# Patient Record
Sex: MALE | Race: BLACK OR AFRICAN AMERICAN | NOT HISPANIC OR LATINO | Employment: OTHER | URBAN - METROPOLITAN AREA
[De-identification: names, ages, dates, MRNs, and addresses within clinical notes are randomized per-mention and may not be internally consistent; named-entity substitution may affect disease eponyms.]

---

## 2019-07-18 ENCOUNTER — APPOINTMENT (EMERGENCY)
Dept: RADIOLOGY | Facility: HOSPITAL | Age: 59
End: 2019-07-18

## 2019-07-18 ENCOUNTER — HOSPITAL ENCOUNTER (EMERGENCY)
Facility: HOSPITAL | Age: 59
Discharge: HOME | End: 2019-07-19
Attending: EMERGENCY MEDICINE

## 2019-07-18 DIAGNOSIS — S01.112A LEFT EYELID LACERATION, INITIAL ENCOUNTER: ICD-10-CM

## 2019-07-18 DIAGNOSIS — F10.929 ALCOHOL INTOXICATION (HCC): ICD-10-CM
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
## 2019-07-18 DIAGNOSIS — S61.219A FINGER LACERATION: ICD-10-CM

## 2019-07-18 DIAGNOSIS — S70.311A ABRASION OF RIGHT THIGH, INITIAL ENCOUNTER: ICD-10-CM

## 2019-07-18 DIAGNOSIS — S02.2XXA NASAL BONE FRACTURE: ICD-10-CM

## 2019-07-18 DIAGNOSIS — S00.31XA ABRASION OF NOSE, INITIAL ENCOUNTER: ICD-10-CM

## 2019-07-18 LAB
ABO GROUP BLD: NORMAL
ALBUMIN SERPL BCP-MCNC: 4 G/DL (ref 3.5–5)
ALP SERPL-CCNC: 106 U/L (ref 46–116)
ALT SERPL W P-5'-P-CCNC: 62 U/L (ref 12–78)
AMPHETAMINES SERPL QL SCN: NEGATIVE
ANION GAP SERPL CALCULATED.3IONS-SCNC: 13 MMOL/L (ref 4–13)
APTT PPP: 24 SECONDS (ref 23–37)
AST SERPL W P-5'-P-CCNC: 42 U/L (ref 5–45)
BACTERIA UR QL AUTO: ABNORMAL /HPF
BARBITURATES UR QL: NEGATIVE
BASOPHILS # BLD AUTO: 0.03 THOUSANDS/ΜL (ref 0–0.1)
BASOPHILS NFR BLD AUTO: 0 % (ref 0–1)
BENZODIAZ UR QL: NEGATIVE
BILIRUB SERPL-MCNC: 0.3 MG/DL (ref 0.2–1)
BILIRUB UR QL STRIP: NEGATIVE
BLD GP AB SCN SERPL QL: NEGATIVE
BUN SERPL-MCNC: 12 MG/DL (ref 5–25)
CALCIUM SERPL-MCNC: 8.9 MG/DL (ref 8.3–10.1)
CHLORIDE SERPL-SCNC: 104 MMOL/L (ref 100–108)
CLARITY UR: CLEAR
CO2 SERPL-SCNC: 23 MMOL/L (ref 21–32)
COCAINE UR QL: NEGATIVE
COLOR UR: ABNORMAL
CREAT SERPL-MCNC: 1.07 MG/DL (ref 0.6–1.3)
EOSINOPHIL # BLD AUTO: 0.07 THOUSAND/ΜL (ref 0–0.61)
EOSINOPHIL NFR BLD AUTO: 1 % (ref 0–6)
ERYTHROCYTE [DISTWIDTH] IN BLOOD BY AUTOMATED COUNT: 14.7 % (ref 11.6–15.1)
ETHANOL SERPL-MCNC: 280 MG/DL (ref 0–3)
GFR SERPL CREATININE-BSD FRML MDRD: 75 ML/MIN/1.73SQ M
GLUCOSE SERPL-MCNC: 114 MG/DL (ref 65–140)
GLUCOSE UR STRIP-MCNC: NEGATIVE MG/DL
HCT VFR BLD AUTO: 40.6 % (ref 36.5–49.3)
HGB BLD-MCNC: 14.5 G/DL (ref 12–17)
HGB UR QL STRIP.AUTO: ABNORMAL
IMM GRANULOCYTES # BLD AUTO: 0.04 THOUSAND/UL (ref 0–0.2)
IMM GRANULOCYTES NFR BLD AUTO: 0 % (ref 0–2)
INR PPP: 0.95 (ref 0.86–1.16)
KETONES UR STRIP-MCNC: NEGATIVE MG/DL
LEUKOCYTE ESTERASE UR QL STRIP: NEGATIVE
LYMPHOCYTES # BLD AUTO: 2.81 THOUSANDS/ΜL (ref 0.6–4.47)
LYMPHOCYTES NFR BLD AUTO: 29 % (ref 14–44)
MAGNESIUM SERPL-MCNC: 1.8 MG/DL (ref 1.6–2.6)
MCH RBC QN AUTO: 32.7 PG (ref 26.8–34.3)
MCHC RBC AUTO-ENTMCNC: 35.7 G/DL (ref 31.4–37.4)
MCV RBC AUTO: 92 FL (ref 82–98)
METHADONE UR QL: NEGATIVE
MONOCYTES # BLD AUTO: 0.82 THOUSAND/ΜL (ref 0.17–1.22)
MONOCYTES NFR BLD AUTO: 8 % (ref 4–12)
NEUTROPHILS # BLD AUTO: 5.95 THOUSANDS/ΜL (ref 1.85–7.62)
NEUTS SEG NFR BLD AUTO: 62 % (ref 43–75)
NITRITE UR QL STRIP: NEGATIVE
NON-SQ EPI CELLS URNS QL MICRO: ABNORMAL /HPF
NRBC BLD AUTO-RTO: 0 /100 WBCS
OPIATES UR QL SCN: NEGATIVE
PCP UR QL: NEGATIVE
PH UR STRIP.AUTO: 5.5 [PH]
PHOSPHATE SERPL-MCNC: 3.6 MG/DL (ref 2.3–4.1)
PLATELET # BLD AUTO: 230 THOUSANDS/UL (ref 149–390)
PMV BLD AUTO: 9.5 FL (ref 8.9–12.7)
POTASSIUM SERPL-SCNC: 4.3 MMOL/L (ref 3.5–5.3)
PROT SERPL-MCNC: 7.8 G/DL (ref 6.4–8.2)
PROT UR STRIP-MCNC: NEGATIVE MG/DL
PROTHROMBIN TIME: 10 SECONDS (ref 9.4–11.7)
RBC # BLD AUTO: 4.43 MILLION/UL (ref 3.88–5.62)
RBC #/AREA URNS AUTO: ABNORMAL /HPF
RH BLD: POSITIVE
SODIUM SERPL-SCNC: 140 MMOL/L (ref 136–145)
SP GR UR STRIP.AUTO: <=1.005 (ref 1–1.03)
SPECIMEN EXPIRATION DATE: NORMAL
THC UR QL: POSITIVE
TROPONIN I SERPL-MCNC: <0.02 NG/ML
UROBILINOGEN UR QL STRIP.AUTO: 0.2 E.U./DL
WBC # BLD AUTO: 9.72 THOUSAND/UL (ref 4.31–10.16)
WBC #/AREA URNS AUTO: ABNORMAL /HPF

## 2019-07-18 PROCEDURE — 93005 ELECTROCARDIOGRAM TRACING: CPT

## 2019-07-18 PROCEDURE — 80053 COMPREHEN METABOLIC PANEL: CPT | Performed by: EMERGENCY MEDICINE

## 2019-07-18 PROCEDURE — 85610 PROTHROMBIN TIME: CPT | Performed by: EMERGENCY MEDICINE

## 2019-07-18 PROCEDURE — 73552 X-RAY EXAM OF FEMUR 2/>: CPT

## 2019-07-18 PROCEDURE — 73130 X-RAY EXAM OF HAND: CPT

## 2019-07-18 PROCEDURE — 73560 X-RAY EXAM OF KNEE 1 OR 2: CPT

## 2019-07-18 PROCEDURE — 96374 THER/PROPH/DIAG INJ IV PUSH: CPT

## 2019-07-18 PROCEDURE — 96361 HYDRATE IV INFUSION ADD-ON: CPT

## 2019-07-18 PROCEDURE — 85025 COMPLETE CBC W/AUTO DIFF WBC: CPT | Performed by: EMERGENCY MEDICINE

## 2019-07-18 PROCEDURE — 70486 CT MAXILLOFACIAL W/O DYE: CPT

## 2019-07-18 PROCEDURE — 85730 THROMBOPLASTIN TIME PARTIAL: CPT | Performed by: EMERGENCY MEDICINE

## 2019-07-18 PROCEDURE — 84484 ASSAY OF TROPONIN QUANT: CPT | Performed by: EMERGENCY MEDICINE

## 2019-07-18 PROCEDURE — 72190 X-RAY EXAM OF PELVIS: CPT

## 2019-07-18 PROCEDURE — 86900 BLOOD TYPING SEROLOGIC ABO: CPT | Performed by: EMERGENCY MEDICINE

## 2019-07-18 PROCEDURE — 80307 DRUG TEST PRSMV CHEM ANLYZR: CPT | Performed by: EMERGENCY MEDICINE

## 2019-07-18 PROCEDURE — 86901 BLOOD TYPING SEROLOGIC RH(D): CPT | Performed by: EMERGENCY MEDICINE

## 2019-07-18 PROCEDURE — 96375 TX/PRO/DX INJ NEW DRUG ADDON: CPT

## 2019-07-18 PROCEDURE — 81001 URINALYSIS AUTO W/SCOPE: CPT | Performed by: EMERGENCY MEDICINE

## 2019-07-18 PROCEDURE — 99285 EMERGENCY DEPT VISIT HI MDM: CPT

## 2019-07-18 PROCEDURE — 71260 CT THORAX DX C+: CPT

## 2019-07-18 PROCEDURE — 84100 ASSAY OF PHOSPHORUS: CPT | Performed by: EMERGENCY MEDICINE

## 2019-07-18 PROCEDURE — 83735 ASSAY OF MAGNESIUM: CPT | Performed by: EMERGENCY MEDICINE

## 2019-07-18 PROCEDURE — 70450 CT HEAD/BRAIN W/O DYE: CPT

## 2019-07-18 PROCEDURE — 36415 COLL VENOUS BLD VENIPUNCTURE: CPT | Performed by: EMERGENCY MEDICINE

## 2019-07-18 PROCEDURE — 74177 CT ABD & PELVIS W/CONTRAST: CPT

## 2019-07-18 PROCEDURE — 72125 CT NECK SPINE W/O DYE: CPT

## 2019-07-18 PROCEDURE — 73080 X-RAY EXAM OF ELBOW: CPT

## 2019-07-18 PROCEDURE — 73090 X-RAY EXAM OF FOREARM: CPT

## 2019-07-18 PROCEDURE — 80320 DRUG SCREEN QUANTALCOHOLS: CPT | Performed by: EMERGENCY MEDICINE

## 2019-07-18 PROCEDURE — 86850 RBC ANTIBODY SCREEN: CPT | Performed by: EMERGENCY MEDICINE

## 2019-07-18 RX ORDER — ATORVASTATIN CALCIUM 20 MG/1
20 TABLET, FILM COATED ORAL
COMMUNITY

## 2019-07-18 RX ORDER — GABAPENTIN 300 MG/1
300 CAPSULE ORAL 3 TIMES DAILY
COMMUNITY

## 2019-07-18 RX ORDER — ONDANSETRON 2 MG/ML
4 INJECTION INTRAMUSCULAR; INTRAVENOUS ONCE
Status: COMPLETED | OUTPATIENT
Start: 2019-07-18 | End: 2019-07-18

## 2019-07-18 RX ORDER — LIDOCAINE HYDROCHLORIDE 20 MG/ML
20 INJECTION, SOLUTION EPIDURAL; INFILTRATION; INTRACAUDAL; PERINEURAL ONCE
Status: COMPLETED | OUTPATIENT
Start: 2019-07-18 | End: 2019-07-18

## 2019-07-18 RX ORDER — HYDROMORPHONE HCL/PF 1 MG/ML
1 SYRINGE (ML) INJECTION ONCE
Status: COMPLETED | OUTPATIENT
Start: 2019-07-18 | End: 2019-07-18

## 2019-07-18 RX ORDER — FENTANYL CITRATE 50 UG/ML
100 INJECTION, SOLUTION INTRAMUSCULAR; INTRAVENOUS ONCE
Status: COMPLETED | OUTPATIENT
Start: 2019-07-18 | End: 2019-07-18

## 2019-07-18 RX ORDER — DIAZEPAM 5 MG/ML
10 INJECTION, SOLUTION INTRAMUSCULAR; INTRAVENOUS ONCE
Status: DISCONTINUED | OUTPATIENT
Start: 2019-07-18 | End: 2019-07-19 | Stop reason: HOSPADM

## 2019-07-18 RX ORDER — GINSENG 100 MG
1 CAPSULE ORAL ONCE
Status: COMPLETED | OUTPATIENT
Start: 2019-07-18 | End: 2019-07-18

## 2019-07-18 RX ADMIN — ONDANSETRON 4 MG: 2 INJECTION INTRAMUSCULAR; INTRAVENOUS at 21:48

## 2019-07-18 RX ADMIN — BACITRACIN ZINC 1 LARGE APPLICATION: 500 OINTMENT TOPICAL at 23:37

## 2019-07-18 RX ADMIN — FENTANYL CITRATE 100 MCG: 50 INJECTION, SOLUTION INTRAMUSCULAR; INTRAVENOUS at 20:59

## 2019-07-18 RX ADMIN — LIDOCAINE HYDROCHLORIDE 20 ML: 20 INJECTION, SOLUTION EPIDURAL; INFILTRATION; INTRACAUDAL; PERINEURAL at 22:52

## 2019-07-18 RX ADMIN — IOHEXOL 100 ML: 350 INJECTION, SOLUTION INTRAVENOUS at 21:34

## 2019-07-18 RX ADMIN — HYDROMORPHONE HYDROCHLORIDE 1 MG: 1 INJECTION, SOLUTION INTRAMUSCULAR; INTRAVENOUS; SUBCUTANEOUS at 22:51

## 2019-07-18 RX ADMIN — SODIUM CHLORIDE 1000 ML: 0.9 INJECTION, SOLUTION INTRAVENOUS at 20:59

## 2019-07-19 ENCOUNTER — APPOINTMENT (EMERGENCY)
Dept: RADIOLOGY | Facility: HOSPITAL | Age: 59
End: 2019-07-19

## 2019-07-19 VITALS
RESPIRATION RATE: 15 BRPM | OXYGEN SATURATION: 97 % | TEMPERATURE: 97.7 F | HEIGHT: 72 IN | DIASTOLIC BLOOD PRESSURE: 91 MMHG | SYSTOLIC BLOOD PRESSURE: 142 MMHG | HEART RATE: 84 BPM

## 2019-07-19 LAB
ATRIAL RATE: 106 BPM
P AXIS: 71 DEGREES
PR INTERVAL: 138 MS
QRS AXIS: 27 DEGREES
QRSD INTERVAL: 86 MS
QT INTERVAL: 334 MS
QTC INTERVAL: 443 MS
T WAVE AXIS: 92 DEGREES
VENTRICULAR RATE: 106 BPM

## 2019-07-19 PROCEDURE — 96375 TX/PRO/DX INJ NEW DRUG ADDON: CPT

## 2019-07-19 PROCEDURE — 93010 ELECTROCARDIOGRAM REPORT: CPT | Performed by: INTERNAL MEDICINE

## 2019-07-19 PROCEDURE — 73700 CT LOWER EXTREMITY W/O DYE: CPT

## 2019-07-19 RX ORDER — AMOXICILLIN AND CLAVULANATE POTASSIUM 875; 125 MG/1; MG/1
1 TABLET, FILM COATED ORAL ONCE
Status: COMPLETED | OUTPATIENT
Start: 2019-07-19 | End: 2019-07-19

## 2019-07-19 RX ORDER — AMOXICILLIN AND CLAVULANATE POTASSIUM 875; 125 MG/1; MG/1
1 TABLET, FILM COATED ORAL EVERY 12 HOURS
Qty: 14 TABLET | Refills: 0 | Status: SHIPPED | OUTPATIENT
Start: 2019-07-19 | End: 2019-07-26

## 2019-07-19 RX ORDER — HYDROMORPHONE HCL/PF 1 MG/ML
1 SYRINGE (ML) INJECTION ONCE
Status: COMPLETED | OUTPATIENT
Start: 2019-07-19 | End: 2019-07-19

## 2019-07-19 RX ORDER — NAPROXEN 500 MG/1
500 TABLET ORAL 2 TIMES DAILY WITH MEALS
Qty: 20 TABLET | Refills: 0 | Status: SHIPPED | OUTPATIENT
Start: 2019-07-19

## 2019-07-19 RX ORDER — TRAMADOL HYDROCHLORIDE 50 MG/1
50 TABLET ORAL EVERY 8 HOURS PRN
Qty: 15 TABLET | Refills: 0 | Status: SHIPPED | OUTPATIENT
Start: 2019-07-19

## 2019-07-19 RX ORDER — BACITRACIN, NEOMYCIN, POLYMYXIN B 400; 3.5; 5 [USP'U]/G; MG/G; [USP'U]/G
OINTMENT TOPICAL 2 TIMES DAILY
Qty: 15 G | Refills: 0 | Status: SHIPPED | OUTPATIENT
Start: 2019-07-19

## 2019-07-19 RX ADMIN — AMOXICILLIN AND CLAVULANATE POTASSIUM 1 TABLET: 875; 125 TABLET, FILM COATED ORAL at 04:51

## 2019-07-19 RX ADMIN — HYDROMORPHONE HYDROCHLORIDE 1 MG: 1 INJECTION, SOLUTION INTRAMUSCULAR; INTRAVENOUS; SUBCUTANEOUS at 00:52

## 2019-07-19 NOTE — ED NOTES
Daughter at bedside  Pt and daughter both verbalized understanding discharge instructions    Pt taken out in a wheelchair, denies any complaints     Char Andrew RN  07/19/19 7467
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
## 2019-07-19 NOTE — ED NOTES
Daughter called,aware patient is going to be discharged   Will come pick him up     Leonel Persaud RN  07/19/19 4314

## 2019-08-07 ENCOUNTER — HOSPITAL ENCOUNTER (INPATIENT)
Facility: HOSPITAL | Age: 59
LOS: 3 days | Discharge: HOME | DRG: 282 | End: 2019-08-10
Attending: EMERGENCY MEDICINE | Admitting: INTERNAL MEDICINE
Payer: COMMERCIAL

## 2019-08-07 ENCOUNTER — APPOINTMENT (EMERGENCY)
Dept: RADIOLOGY | Facility: HOSPITAL | Age: 59
DRG: 282 | End: 2019-08-07
Payer: COMMERCIAL

## 2019-08-07 DIAGNOSIS — K85.90 PANCREATITIS: ICD-10-CM

## 2019-08-07 DIAGNOSIS — I10 BENIGN ESSENTIAL HTN: ICD-10-CM

## 2019-08-07 DIAGNOSIS — R10.9 ABDOMINAL PAIN: Primary | ICD-10-CM

## 2019-08-07 DIAGNOSIS — F17.200 SMOKER: ICD-10-CM

## 2019-08-07 PROBLEM — S39.91XA ABDOMINAL TRAUMA: Status: ACTIVE | Noted: 2019-08-07

## 2019-08-07 PROBLEM — F12.90 MARIJUANA USE: Status: ACTIVE | Noted: 2019-08-07

## 2019-08-07 PROBLEM — F10.10 ALCOHOL ABUSE: Status: ACTIVE | Noted: 2019-08-07

## 2019-08-07 PROBLEM — E78.5 DYSLIPIDEMIA: Status: ACTIVE | Noted: 2019-08-07

## 2019-08-07 PROBLEM — R65.10 SIRS (SYSTEMIC INFLAMMATORY RESPONSE SYNDROME) (HCC): Status: ACTIVE | Noted: 2019-08-07

## 2019-08-07 PROBLEM — K85.20 ALCOHOL-INDUCED ACUTE PANCREATITIS WITHOUT INFECTION OR NECROSIS: Status: ACTIVE | Noted: 2019-08-07

## 2019-08-07 PROBLEM — R07.89 ATYPICAL CHEST PAIN: Status: ACTIVE | Noted: 2019-08-07

## 2019-08-07 LAB
ALBUMIN SERPL BCP-MCNC: 4.1 G/DL (ref 3.5–5)
ALP SERPL-CCNC: 109 U/L (ref 46–116)
ALT SERPL W P-5'-P-CCNC: 46 U/L (ref 12–78)
AMPHETAMINES SERPL QL SCN: NEGATIVE
ANION GAP SERPL CALCULATED.3IONS-SCNC: 16 MMOL/L (ref 4–13)
AST SERPL W P-5'-P-CCNC: 29 U/L (ref 5–45)
ATRIAL RATE: 105 BPM
ATRIAL RATE: 267 BPM
BARBITURATES UR QL: NEGATIVE
BASOPHILS # BLD AUTO: 0.01 THOUSANDS/ΜL (ref 0–0.1)
BASOPHILS NFR BLD AUTO: 0 % (ref 0–1)
BENZODIAZ UR QL: NEGATIVE
BILIRUB SERPL-MCNC: 0.3 MG/DL (ref 0.2–1)
BUN SERPL-MCNC: 9 MG/DL (ref 5–25)
CALCIUM SERPL-MCNC: 9.1 MG/DL (ref 8.3–10.1)
CHLORIDE SERPL-SCNC: 104 MMOL/L (ref 100–108)
CO2 SERPL-SCNC: 22 MMOL/L (ref 21–32)
COCAINE UR QL: NEGATIVE
CREAT SERPL-MCNC: 0.96 MG/DL (ref 0.6–1.3)
EOSINOPHIL # BLD AUTO: 0.05 THOUSAND/ΜL (ref 0–0.61)
EOSINOPHIL NFR BLD AUTO: 1 % (ref 0–6)
ERYTHROCYTE [DISTWIDTH] IN BLOOD BY AUTOMATED COUNT: 14.8 % (ref 11.6–15.1)
ETHANOL SERPL-MCNC: 171 MG/DL (ref 0–3)
GFR SERPL CREATININE-BSD FRML MDRD: 98 ML/MIN/1.73SQ M
GLUCOSE SERPL-MCNC: 108 MG/DL (ref 65–140)
HCT VFR BLD AUTO: 39.2 % (ref 36.5–49.3)
HGB BLD-MCNC: 13.2 G/DL (ref 12–17)
LIPASE SERPL-CCNC: 1320 U/L (ref 73–393)
LYMPHOCYTES # BLD AUTO: 1.74 THOUSANDS/ΜL (ref 0.6–4.47)
LYMPHOCYTES NFR BLD AUTO: 26 % (ref 14–44)
MCH RBC QN AUTO: 30 PG (ref 26.8–34.3)
MCHC RBC AUTO-ENTMCNC: 33.7 G/DL (ref 31.4–37.4)
MCV RBC AUTO: 89 FL (ref 82–98)
METHADONE UR QL: NEGATIVE
MONOCYTES # BLD AUTO: 0.58 THOUSAND/ΜL (ref 0.17–1.22)
MONOCYTES NFR BLD AUTO: 9 % (ref 4–12)
NEUTROPHILS # BLD AUTO: 4.28 THOUSANDS/ΜL (ref 1.85–7.62)
NEUTS SEG NFR BLD AUTO: 64 % (ref 43–75)
OPIATES UR QL SCN: NEGATIVE
P AXIS: 66 DEGREES
PCP UR QL: NEGATIVE
PLATELET # BLD AUTO: 251 THOUSANDS/UL (ref 149–390)
PMV BLD AUTO: 8.7 FL (ref 8.9–12.7)
POTASSIUM SERPL-SCNC: 4 MMOL/L (ref 3.5–5.3)
PR INTERVAL: 128 MS
PROT SERPL-MCNC: 8.3 G/DL (ref 6.4–8.2)
QRS AXIS: 9 DEGREES
QRS AXIS: 9 DEGREES
QRSD INTERVAL: 74 MS
QRSD INTERVAL: 82 MS
QT INTERVAL: 326 MS
QT INTERVAL: 334 MS
QTC INTERVAL: 430 MS
QTC INTERVAL: 452 MS
RBC # BLD AUTO: 4.4 MILLION/UL (ref 3.88–5.62)
SODIUM SERPL-SCNC: 142 MMOL/L (ref 136–145)
T WAVE AXIS: -11 DEGREES
T WAVE AXIS: 263 DEGREES
THC UR QL: POSITIVE
TROPONIN I SERPL-MCNC: 0.02 NG/ML
TROPONIN I SERPL-MCNC: 0.03 NG/ML
TROPONIN I SERPL-MCNC: <0.02 NG/ML
VENTRICULAR RATE: 105 BPM
VENTRICULAR RATE: 110 BPM
WBC # BLD AUTO: 6.66 THOUSAND/UL (ref 4.31–10.16)

## 2019-08-07 PROCEDURE — 70450 CT HEAD/BRAIN W/O DYE: CPT

## 2019-08-07 PROCEDURE — 87081 CULTURE SCREEN ONLY: CPT | Performed by: STUDENT IN AN ORGANIZED HEALTH CARE EDUCATION/TRAINING PROGRAM

## 2019-08-07 PROCEDURE — 93010 ELECTROCARDIOGRAM REPORT: CPT | Performed by: INTERNAL MEDICINE

## 2019-08-07 PROCEDURE — NC001 PR NO CHARGE: Performed by: SURGERY

## 2019-08-07 PROCEDURE — 96375 TX/PRO/DX INJ NEW DRUG ADDON: CPT

## 2019-08-07 PROCEDURE — 36415 COLL VENOUS BLD VENIPUNCTURE: CPT | Performed by: EMERGENCY MEDICINE

## 2019-08-07 PROCEDURE — 80307 DRUG TEST PRSMV CHEM ANLYZR: CPT | Performed by: EMERGENCY MEDICINE

## 2019-08-07 PROCEDURE — 96374 THER/PROPH/DIAG INJ IV PUSH: CPT

## 2019-08-07 PROCEDURE — 83690 ASSAY OF LIPASE: CPT | Performed by: EMERGENCY MEDICINE

## 2019-08-07 PROCEDURE — 99285 EMERGENCY DEPT VISIT HI MDM: CPT

## 2019-08-07 PROCEDURE — 85025 COMPLETE CBC W/AUTO DIFF WBC: CPT | Performed by: EMERGENCY MEDICINE

## 2019-08-07 PROCEDURE — 96361 HYDRATE IV INFUSION ADD-ON: CPT

## 2019-08-07 PROCEDURE — 93005 ELECTROCARDIOGRAM TRACING: CPT

## 2019-08-07 PROCEDURE — 84484 ASSAY OF TROPONIN QUANT: CPT | Performed by: EMERGENCY MEDICINE

## 2019-08-07 PROCEDURE — 71045 X-RAY EXAM CHEST 1 VIEW: CPT

## 2019-08-07 PROCEDURE — 74177 CT ABD & PELVIS W/CONTRAST: CPT

## 2019-08-07 PROCEDURE — 99223 1ST HOSP IP/OBS HIGH 75: CPT | Performed by: STUDENT IN AN ORGANIZED HEALTH CARE EDUCATION/TRAINING PROGRAM

## 2019-08-07 PROCEDURE — 84484 ASSAY OF TROPONIN QUANT: CPT | Performed by: STUDENT IN AN ORGANIZED HEALTH CARE EDUCATION/TRAINING PROGRAM

## 2019-08-07 PROCEDURE — 80053 COMPREHEN METABOLIC PANEL: CPT | Performed by: EMERGENCY MEDICINE

## 2019-08-07 PROCEDURE — 80320 DRUG SCREEN QUANTALCOHOLS: CPT | Performed by: EMERGENCY MEDICINE

## 2019-08-07 PROCEDURE — 99223 1ST HOSP IP/OBS HIGH 75: CPT | Performed by: INTERNAL MEDICINE

## 2019-08-07 PROCEDURE — 96376 TX/PRO/DX INJ SAME DRUG ADON: CPT

## 2019-08-07 RX ORDER — HYDRALAZINE HYDROCHLORIDE 20 MG/ML
5 INJECTION INTRAMUSCULAR; INTRAVENOUS EVERY 6 HOURS PRN
Status: DISCONTINUED | OUTPATIENT
Start: 2019-08-07 | End: 2019-08-10 | Stop reason: HOSPADM

## 2019-08-07 RX ORDER — MORPHINE SULFATE 4 MG/ML
4 INJECTION, SOLUTION INTRAMUSCULAR; INTRAVENOUS ONCE
Status: COMPLETED | OUTPATIENT
Start: 2019-08-07 | End: 2019-08-07

## 2019-08-07 RX ORDER — SODIUM CHLORIDE, SODIUM LACTATE, POTASSIUM CHLORIDE, CALCIUM CHLORIDE 600; 310; 30; 20 MG/100ML; MG/100ML; MG/100ML; MG/100ML
125 INJECTION, SOLUTION INTRAVENOUS CONTINUOUS
Status: DISCONTINUED | OUTPATIENT
Start: 2019-08-07 | End: 2019-08-10

## 2019-08-07 RX ORDER — ACETAMINOPHEN 325 MG/1
650 TABLET ORAL EVERY 6 HOURS PRN
Status: DISCONTINUED | OUTPATIENT
Start: 2019-08-07 | End: 2019-08-10 | Stop reason: HOSPADM

## 2019-08-07 RX ORDER — TRAMADOL HYDROCHLORIDE 50 MG/1
50 TABLET ORAL EVERY 8 HOURS PRN
Status: DISCONTINUED | OUTPATIENT
Start: 2019-08-07 | End: 2019-08-10 | Stop reason: HOSPADM

## 2019-08-07 RX ORDER — ONDANSETRON 2 MG/ML
4 INJECTION INTRAMUSCULAR; INTRAVENOUS ONCE
Status: COMPLETED | OUTPATIENT
Start: 2019-08-07 | End: 2019-08-07

## 2019-08-07 RX ORDER — LIDOCAINE HYDROCHLORIDE 20 MG/ML
15 SOLUTION OROPHARYNGEAL ONCE
Status: COMPLETED | OUTPATIENT
Start: 2019-08-07 | End: 2019-08-07

## 2019-08-07 RX ORDER — MAGNESIUM HYDROXIDE/ALUMINUM HYDROXICE/SIMETHICONE 120; 1200; 1200 MG/30ML; MG/30ML; MG/30ML
30 SUSPENSION ORAL ONCE
Status: COMPLETED | OUTPATIENT
Start: 2019-08-07 | End: 2019-08-07

## 2019-08-07 RX ORDER — ASPIRIN 81 MG/1
324 TABLET, CHEWABLE ORAL ONCE
Status: COMPLETED | OUTPATIENT
Start: 2019-08-07 | End: 2019-08-07

## 2019-08-07 RX ORDER — GABAPENTIN 300 MG/1
300 CAPSULE ORAL 3 TIMES DAILY
Status: DISCONTINUED | OUTPATIENT
Start: 2019-08-07 | End: 2019-08-10 | Stop reason: HOSPADM

## 2019-08-07 RX ORDER — LORAZEPAM 2 MG/ML
2 INJECTION INTRAMUSCULAR ONCE
Status: COMPLETED | OUTPATIENT
Start: 2019-08-07 | End: 2019-08-07

## 2019-08-07 RX ORDER — LANOLIN ALCOHOL/MO/W.PET/CERES
6 CREAM (GRAM) TOPICAL
Status: DISCONTINUED | OUTPATIENT
Start: 2019-08-07 | End: 2019-08-10 | Stop reason: HOSPADM

## 2019-08-07 RX ORDER — NICOTINE 21 MG/24HR
1 PATCH, TRANSDERMAL 24 HOURS TRANSDERMAL DAILY
Status: DISCONTINUED | OUTPATIENT
Start: 2019-08-07 | End: 2019-08-10 | Stop reason: HOSPADM

## 2019-08-07 RX ORDER — ATORVASTATIN CALCIUM 20 MG/1
20 TABLET, FILM COATED ORAL
Status: DISCONTINUED | OUTPATIENT
Start: 2019-08-07 | End: 2019-08-10 | Stop reason: HOSPADM

## 2019-08-07 RX ORDER — AMLODIPINE BESYLATE 10 MG/1
10 TABLET ORAL DAILY
Status: DISCONTINUED | OUTPATIENT
Start: 2019-08-07 | End: 2019-08-10 | Stop reason: HOSPADM

## 2019-08-07 RX ORDER — MAGNESIUM HYDROXIDE/ALUMINUM HYDROXICE/SIMETHICONE 120; 1200; 1200 MG/30ML; MG/30ML; MG/30ML
30 SUSPENSION ORAL EVERY 6 HOURS PRN
Status: DISCONTINUED | OUTPATIENT
Start: 2019-08-07 | End: 2019-08-10 | Stop reason: HOSPADM

## 2019-08-07 RX ORDER — SODIUM CHLORIDE 9 MG/ML
100 INJECTION, SOLUTION INTRAVENOUS CONTINUOUS
Status: DISCONTINUED | OUTPATIENT
Start: 2019-08-07 | End: 2019-08-07

## 2019-08-07 RX ORDER — ONDANSETRON 2 MG/ML
4 INJECTION INTRAMUSCULAR; INTRAVENOUS EVERY 6 HOURS PRN
Status: DISCONTINUED | OUTPATIENT
Start: 2019-08-07 | End: 2019-08-10 | Stop reason: HOSPADM

## 2019-08-07 RX ADMIN — MORPHINE SULFATE 4 MG: 4 INJECTION INTRAVENOUS at 08:55

## 2019-08-07 RX ADMIN — AMLODIPINE BESYLATE 10 MG: 10 TABLET ORAL at 14:13

## 2019-08-07 RX ADMIN — FAMOTIDINE 20 MG: 10 INJECTION, SOLUTION INTRAVENOUS at 20:01

## 2019-08-07 RX ADMIN — MORPHINE SULFATE 4 MG: 4 INJECTION INTRAVENOUS at 07:53

## 2019-08-07 RX ADMIN — ONDANSETRON 4 MG: 2 INJECTION INTRAMUSCULAR; INTRAVENOUS at 07:04

## 2019-08-07 RX ADMIN — ASPIRIN 81 MG 324 MG: 81 TABLET ORAL at 07:03

## 2019-08-07 RX ADMIN — SODIUM CHLORIDE, SODIUM LACTATE, POTASSIUM CHLORIDE, AND CALCIUM CHLORIDE 200 ML/HR: .6; .31; .03; .02 INJECTION, SOLUTION INTRAVENOUS at 14:12

## 2019-08-07 RX ADMIN — ENOXAPARIN SODIUM 40 MG: 40 INJECTION SUBCUTANEOUS at 14:12

## 2019-08-07 RX ADMIN — LIDOCAINE HYDROCHLORIDE 15 ML: 20 SOLUTION ORAL; TOPICAL at 07:07

## 2019-08-07 RX ADMIN — SODIUM CHLORIDE 100 ML/HR: 0.9 INJECTION, SOLUTION INTRAVENOUS at 08:58

## 2019-08-07 RX ADMIN — GABAPENTIN 300 MG: 300 CAPSULE ORAL at 15:21

## 2019-08-07 RX ADMIN — NICOTINE 1 PATCH: 14 PATCH TRANSDERMAL at 14:12

## 2019-08-07 RX ADMIN — HYDRALAZINE HYDROCHLORIDE 5 MG: 20 INJECTION INTRAMUSCULAR; INTRAVENOUS at 15:18

## 2019-08-07 RX ADMIN — TRAMADOL HYDROCHLORIDE 50 MG: 50 TABLET, COATED ORAL at 21:30

## 2019-08-07 RX ADMIN — IOHEXOL 100 ML: 350 INJECTION, SOLUTION INTRAVENOUS at 08:43

## 2019-08-07 RX ADMIN — ALUMINUM HYDROXIDE, MAGNESIUM HYDROXIDE, AND SIMETHICONE 30 ML: 200; 200; 20 SUSPENSION ORAL at 07:06

## 2019-08-07 RX ADMIN — SODIUM CHLORIDE, SODIUM LACTATE, POTASSIUM CHLORIDE, AND CALCIUM CHLORIDE 200 ML/HR: .6; .31; .03; .02 INJECTION, SOLUTION INTRAVENOUS at 19:51

## 2019-08-07 RX ADMIN — MELATONIN 6 MG: 3 TAB ORAL at 23:46

## 2019-08-07 RX ADMIN — SODIUM CHLORIDE 1000 ML: 0.9 INJECTION, SOLUTION INTRAVENOUS at 07:10

## 2019-08-07 RX ADMIN — GABAPENTIN 300 MG: 300 CAPSULE ORAL at 20:01

## 2019-08-07 RX ADMIN — LORAZEPAM 2 MG: 2 INJECTION INTRAMUSCULAR; INTRAVENOUS at 14:39

## 2019-08-07 RX ADMIN — ATORVASTATIN CALCIUM 20 MG: 20 TABLET, FILM COATED ORAL at 21:28

## 2019-08-07 RX ADMIN — FAMOTIDINE 20 MG: 10 INJECTION, SOLUTION INTRAVENOUS at 14:12

## 2019-08-07 RX ADMIN — ACETAMINOPHEN 650 MG: 325 TABLET, FILM COATED ORAL at 15:20

## 2019-08-07 RX ADMIN — ALUMINUM HYDROXIDE, MAGNESIUM HYDROXIDE, AND SIMETHICONE 30 ML: 200; 200; 20 SUSPENSION ORAL at 21:30

## 2019-08-07 RX ADMIN — FOLIC ACID: 5 INJECTION, SOLUTION INTRAMUSCULAR; INTRAVENOUS; SUBCUTANEOUS at 14:43

## 2019-08-07 NOTE — ASSESSMENT & PLAN NOTE
Patient presenting with atypical chest pain radiating from right side up to right shoulder     Likely referred pain from pancreatitis and/or GERD symptoms but will rule out ACS  Initial troponin nml  EKG with no ischemic changed  · Trend troponins  · Tele monitor  · Treat pancreatitis as noted  · Start PPI for reflux symptoms

## 2019-08-07 NOTE — ASSESSMENT & PLAN NOTE
Patient with tachypnea and tachycardia on arrival  Appears to be reactive from acute pancreatitis, and pain  No evidence of infection   No sepsis  Treat as noted
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
## 2019-08-07 NOTE — ASSESSMENT & PLAN NOTE
BP elevated on admission   Patient in pain which may be contributing  Continue amlodipine  Monitor BP, may require medication titration if remains elevated
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
## 2019-08-07 NOTE — ASSESSMENT & PLAN NOTE
Patient presented with epigastric pain, N/V  Lipase 1320, WBC and LFTs nml  CT A/P showed no acute pancreas findings   Liver/GB unremarkable  Alcohol level on admission 171  Likely alcohol induced acute pancreatitis  · Admit to inpatient  · Bowel rest, aggressive IVF hydration, pain control  · Check lipid panel  · GI consulted, recs appreciated

## 2019-08-07 NOTE — ASSESSMENT & PLAN NOTE
Pt with known chronic alcohol abuse     EtOH level 171 on admission  · Place on CIWA protocol  · Seizure precautions  · Falls precautions  · MVI, thiamine and folate supplementation
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
## 2019-08-08 ENCOUNTER — APPOINTMENT (INPATIENT)
Dept: RADIOLOGY | Facility: HOSPITAL | Age: 59
DRG: 282 | End: 2019-08-08
Payer: COMMERCIAL

## 2019-08-08 LAB
ALBUMIN SERPL BCP-MCNC: 3.2 G/DL (ref 3.5–5)
ALP SERPL-CCNC: 89 U/L (ref 46–116)
ALT SERPL W P-5'-P-CCNC: 30 U/L (ref 12–78)
ANION GAP SERPL CALCULATED.3IONS-SCNC: 9 MMOL/L (ref 4–13)
AST SERPL W P-5'-P-CCNC: 21 U/L (ref 5–45)
BILIRUB SERPL-MCNC: 0.7 MG/DL (ref 0.2–1)
BUN SERPL-MCNC: 7 MG/DL (ref 5–25)
CALCIUM SERPL-MCNC: 8.4 MG/DL (ref 8.3–10.1)
CHLORIDE SERPL-SCNC: 102 MMOL/L (ref 100–108)
CHOLEST SERPL-MCNC: 211 MG/DL (ref 50–200)
CO2 SERPL-SCNC: 25 MMOL/L (ref 21–32)
CREAT SERPL-MCNC: 0.73 MG/DL (ref 0.6–1.3)
ERYTHROCYTE [DISTWIDTH] IN BLOOD BY AUTOMATED COUNT: 13.9 % (ref 11.6–15.1)
GFR SERPL CREATININE-BSD FRML MDRD: 116 ML/MIN/1.73SQ M
GLUCOSE SERPL-MCNC: 82 MG/DL (ref 65–140)
HCT VFR BLD AUTO: 38.4 % (ref 36.5–49.3)
HDLC SERPL-MCNC: 83 MG/DL (ref 40–60)
HGB BLD-MCNC: 12.9 G/DL (ref 12–17)
LDLC SERPL CALC-MCNC: 110 MG/DL (ref 0–100)
LIPASE SERPL-CCNC: 256 U/L (ref 73–393)
MAGNESIUM SERPL-MCNC: 1.5 MG/DL (ref 1.6–2.6)
MCH RBC QN AUTO: 30.6 PG (ref 26.8–34.3)
MCHC RBC AUTO-ENTMCNC: 33.6 G/DL (ref 31.4–37.4)
MCV RBC AUTO: 91 FL (ref 82–98)
MRSA NOSE QL CULT: NORMAL
PLATELET # BLD AUTO: 188 THOUSANDS/UL (ref 149–390)
PMV BLD AUTO: 9.3 FL (ref 8.9–12.7)
POTASSIUM SERPL-SCNC: 3.7 MMOL/L (ref 3.5–5.3)
PROT SERPL-MCNC: 6.3 G/DL (ref 6.4–8.2)
RBC # BLD AUTO: 4.21 MILLION/UL (ref 3.88–5.62)
SODIUM SERPL-SCNC: 136 MMOL/L (ref 136–145)
TRIGL SERPL-MCNC: 90 MG/DL
WBC # BLD AUTO: 5.8 THOUSAND/UL (ref 4.31–10.16)

## 2019-08-08 PROCEDURE — 76705 ECHO EXAM OF ABDOMEN: CPT

## 2019-08-08 PROCEDURE — 99232 SBSQ HOSP IP/OBS MODERATE 35: CPT | Performed by: STUDENT IN AN ORGANIZED HEALTH CARE EDUCATION/TRAINING PROGRAM

## 2019-08-08 PROCEDURE — 83690 ASSAY OF LIPASE: CPT | Performed by: STUDENT IN AN ORGANIZED HEALTH CARE EDUCATION/TRAINING PROGRAM

## 2019-08-08 PROCEDURE — 83735 ASSAY OF MAGNESIUM: CPT | Performed by: STUDENT IN AN ORGANIZED HEALTH CARE EDUCATION/TRAINING PROGRAM

## 2019-08-08 PROCEDURE — 85027 COMPLETE CBC AUTOMATED: CPT | Performed by: STUDENT IN AN ORGANIZED HEALTH CARE EDUCATION/TRAINING PROGRAM

## 2019-08-08 PROCEDURE — 80061 LIPID PANEL: CPT | Performed by: STUDENT IN AN ORGANIZED HEALTH CARE EDUCATION/TRAINING PROGRAM

## 2019-08-08 PROCEDURE — 99232 SBSQ HOSP IP/OBS MODERATE 35: CPT | Performed by: INTERNAL MEDICINE

## 2019-08-08 PROCEDURE — 80053 COMPREHEN METABOLIC PANEL: CPT | Performed by: STUDENT IN AN ORGANIZED HEALTH CARE EDUCATION/TRAINING PROGRAM

## 2019-08-08 RX ORDER — MAGNESIUM SULFATE HEPTAHYDRATE 40 MG/ML
2 INJECTION, SOLUTION INTRAVENOUS ONCE
Status: COMPLETED | OUTPATIENT
Start: 2019-08-08 | End: 2019-08-08

## 2019-08-08 RX ORDER — CLONIDINE HYDROCHLORIDE 0.1 MG/1
0.1 TABLET ORAL EVERY 12 HOURS SCHEDULED
Status: DISCONTINUED | OUTPATIENT
Start: 2019-08-08 | End: 2019-08-10 | Stop reason: HOSPADM

## 2019-08-08 RX ADMIN — SODIUM CHLORIDE, SODIUM LACTATE, POTASSIUM CHLORIDE, AND CALCIUM CHLORIDE 125 ML/HR: .6; .31; .03; .02 INJECTION, SOLUTION INTRAVENOUS at 23:16

## 2019-08-08 RX ADMIN — MELATONIN 6 MG: 3 TAB ORAL at 22:00

## 2019-08-08 RX ADMIN — GABAPENTIN 300 MG: 300 CAPSULE ORAL at 08:46

## 2019-08-08 RX ADMIN — FOLIC ACID: 5 INJECTION, SOLUTION INTRAMUSCULAR; INTRAVENOUS; SUBCUTANEOUS at 08:42

## 2019-08-08 RX ADMIN — FAMOTIDINE 20 MG: 10 INJECTION, SOLUTION INTRAVENOUS at 20:17

## 2019-08-08 RX ADMIN — CLONIDINE HYDROCHLORIDE 0.1 MG: 0.1 TABLET ORAL at 08:46

## 2019-08-08 RX ADMIN — NICOTINE 1 PATCH: 14 PATCH TRANSDERMAL at 08:47

## 2019-08-08 RX ADMIN — SODIUM CHLORIDE, SODIUM LACTATE, POTASSIUM CHLORIDE, AND CALCIUM CHLORIDE 200 ML/HR: .6; .31; .03; .02 INJECTION, SOLUTION INTRAVENOUS at 01:11

## 2019-08-08 RX ADMIN — TRAMADOL HYDROCHLORIDE 50 MG: 50 TABLET, COATED ORAL at 05:34

## 2019-08-08 RX ADMIN — FAMOTIDINE 20 MG: 10 INJECTION, SOLUTION INTRAVENOUS at 08:44

## 2019-08-08 RX ADMIN — SODIUM CHLORIDE, SODIUM LACTATE, POTASSIUM CHLORIDE, AND CALCIUM CHLORIDE 125 ML/HR: .6; .31; .03; .02 INJECTION, SOLUTION INTRAVENOUS at 13:47

## 2019-08-08 RX ADMIN — CLONIDINE HYDROCHLORIDE 0.1 MG: 0.1 TABLET ORAL at 20:20

## 2019-08-08 RX ADMIN — ENOXAPARIN SODIUM 40 MG: 40 INJECTION SUBCUTANEOUS at 08:48

## 2019-08-08 RX ADMIN — SODIUM CHLORIDE, SODIUM LACTATE, POTASSIUM CHLORIDE, AND CALCIUM CHLORIDE 200 ML/HR: .6; .31; .03; .02 INJECTION, SOLUTION INTRAVENOUS at 05:36

## 2019-08-08 RX ADMIN — GABAPENTIN 300 MG: 300 CAPSULE ORAL at 16:43

## 2019-08-08 RX ADMIN — MAGNESIUM SULFATE HEPTAHYDRATE 2 G: 40 INJECTION, SOLUTION INTRAVENOUS at 09:52

## 2019-08-08 RX ADMIN — AMLODIPINE BESYLATE 10 MG: 10 TABLET ORAL at 08:46

## 2019-08-08 RX ADMIN — GABAPENTIN 300 MG: 300 CAPSULE ORAL at 20:19

## 2019-08-08 RX ADMIN — ATORVASTATIN CALCIUM 20 MG: 20 TABLET, FILM COATED ORAL at 22:00

## 2019-08-08 RX ADMIN — MORPHINE SULFATE 2 MG: 2 INJECTION, SOLUTION INTRAMUSCULAR; INTRAVENOUS at 03:47

## 2019-08-08 RX ADMIN — ONDANSETRON 4 MG: 2 INJECTION INTRAMUSCULAR; INTRAVENOUS at 10:56
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
## 2019-08-08 NOTE — PROGRESS NOTES
Progress Note - Sara Sequeira 62 y o  male MRN: [id number]    Unit/Bed#: [address] [place] [address] A Encounter: [id number]        Subjective:   Patient endorses some nausea, still has abdominal pain but says his less than yesterday  No subjective fevers or chills  Objective:     Vitals: Blood pressure (!) 155/102, pulse 75, temperature (!) 97 2 °F (36 2 °C), temperature source Oral, resp  rate 18, height 6' (1 829 m), weight 87 2 kg (192 lb 3 9 oz), SpO2 99 %  ,Body mass index is 26 07 kg/m²  Intake/Output Summary (Last 24 hours) at 8/8/2019 1141  Last data filed at 8/8/2019 0851  Gross per 24 hour   Intake 4030 ml   Output 1715 ml   Net 2315 ml       Physical Exam:   General appearance: alert, appears stated age and cooperative  Lungs: clear to auscultation bilaterally, no labored breathing/accessory muscle use  Heart: regular rate and rhythm, S1, S2 normal, no murmur, click, rub or gallop  Abdomen: soft, tender in the periumbilical region with no guarding; bowel sounds normal; no masses,  no organomegaly  Extremities: no edema    Invasive Devices     Peripheral Intravenous Line            Peripheral IV 08/07/19 Left Antecubital 1 day                Lab, Imaging and other studies: I have personally reviewed pertinent reports      Admission on 08/07/2019   Component Date Value    WBC 08/07/2019 6 66     RBC 08/07/2019 4 40     Hemoglobin 08/07/2019 13 2     Hematocrit 08/07/2019 39 2     MCV 08/07/2019 89     MCH 08/07/2019 30 0     MCHC 08/07/2019 33 7     RDW 08/07/2019 14 8     MPV 08/07/2019 8 7*    Platelets 88/50/0978 251     Neutrophils Relative 08/07/2019 64     Lymphocytes Relative 08/07/2019 26     Monocytes Relative 08/07/2019 9     Eosinophils Relative 08/07/2019 1     Basophils Relative 08/07/2019 0     Neutrophils Absolute 08/07/2019 4 28     Lymphocytes Absolute 08/07/2019 1 74     Monocytes Absolute 08/07/2019 0 58     Eosinophils Absolute 08/07/2019 0 05     Basophils Absolute 08/07/2019 0 01     Sodium 08/07/2019 142     Potassium 08/07/2019 4 0     Chloride 08/07/2019 104     CO2 08/07/2019 22     ANION GAP 08/07/2019 16*    BUN 08/07/2019 9     Creatinine 08/07/2019 0 96     Glucose 08/07/2019 108     Calcium 08/07/2019 9 1     AST 08/07/2019 29     ALT 08/07/2019 46     Alkaline Phosphatase 08/07/2019 109     Total Protein 08/07/2019 8 3*    Albumin 08/07/2019 4 1     Total Bilirubin 08/07/2019 0 30     eGFR 08/07/2019 98     Troponin I 08/07/2019 <0 02     Ethanol Lvl 08/07/2019 171*    Lipase 08/07/2019 1,320*    Amph/Meth UR 08/07/2019 Negative     Barbiturate Ur 08/07/2019 Negative     Benzodiazepine Urine 08/07/2019 Negative     Cocaine Urine 08/07/2019 Negative     Methadone Urine 08/07/2019 Negative     Opiate Urine 08/07/2019 Negative     PCP Ur 08/07/2019 Negative     THC Urine 08/07/2019 Positive*    Ventricular Rate 08/07/2019 110     Atrial Rate 08/07/2019 267     QRSD Interval 08/07/2019 74     QT Interval 08/07/2019 334     QTC Interval 08/07/2019 452     QRS Axis 08/07/2019 9     T Wave Haslet 08/07/2019 263     Ventricular Rate 08/07/2019 105     Atrial Rate 08/07/2019 105     OH Interval 08/07/2019 128     QRSD Interval 08/07/2019 82     QT Interval 08/07/2019 326     QTC Interval 08/07/2019 430     P Axis 08/07/2019 66     QRS Axis 08/07/2019 9     T Wave Axis 08/07/2019 -11     Troponin I 08/07/2019 0 03     Troponin I 08/07/2019 0 02     Lipase 08/08/2019 256     Sodium 08/08/2019 136     Potassium 08/08/2019 3 7     Chloride 08/08/2019 102     CO2 08/08/2019 25     ANION GAP 08/08/2019 9     BUN 08/08/2019 7     Creatinine 08/08/2019 0 73     Glucose 08/08/2019 82     Calcium 08/08/2019 8 4     AST 08/08/2019 21     ALT 08/08/2019 30     Alkaline Phosphatase 08/08/2019 89     Total Protein 08/08/2019 6 3*    Albumin 08/08/2019 3 2*    Total Bilirubin 08/08/2019 0 70     eGFR 08/08/2019 116     WBC 08/08/2019 5 80     RBC 08/08/2019 4 21     Hemoglobin 08/08/2019 12 9     Hematocrit 08/08/2019 38 4     MCV 08/08/2019 91     [address] 08/08/2019 30 6     MCHC 08/08/2019 33 6     RDW 08/08/2019 13 9     Platelets 50/05/9806 188     MPV 08/08/2019 9 3     Cholesterol 08/08/2019 211*    Triglycerides 08/08/2019 90     HDL, Direct 08/08/2019 83*    LDL Calculated 08/08/2019 110*    Magnesium 08/08/2019 1 5*     Results for Yaneth Person (MRN [id number]) as of 8/8/2019 11:41   Ref   Range 8/7/2019 07:29 8/7/2019 07:42 8/7/2019 08:00 8/7/2019 13:37 8/7/2019 13:40 8/7/2019 16:21 8/8/2019 05:15   Sodium Latest Ref Range: 136 - 145 mmol/L       136   Potassium Latest Ref Range: 3 5 - 5 3 mmol/L       3 7   Chloride Latest Ref Range: 100 - 108 mmol/L       102   CO2 Latest Ref Range: 21 - 32 mmol/L       25   Anion Gap Latest Ref Range: 4 - 13 mmol/L       9   BUN Latest Ref Range: 5 - 25 mg/dL       7   Creatinine Latest Ref Range: 0 60 - 1 30 mg/dL       0 73   Glucose, Random Latest Ref Range: 65 - 140 mg/dL       82   Calcium Latest Ref Range: 8 3 - 10 1 mg/dL       8 4   AST Latest Ref Range: 5 - 45 U/L       21   ALT Latest Ref Range: 12 - 78 U/L       30   Alkaline Phosphatase Latest Ref Range: 46 - 116 U/L       89   Total Protein Latest Ref Range: 6 4 - 8 2 g/dL       6 3 (L)   Albumin Latest Ref Range: 3 5 - 5 0 g/dL       3 2 (L)   TOTAL BILIRUBIN Latest Ref Range: 0 20 - 1 00 mg/dL       0 70   eGFR Latest Units: ml/min/1 73sq m       116   Magnesium Latest Ref Range: 1 6 - 2 6 mg/dL       1 5 (L)   Lipase Latest Ref Range: 73 - 393 u/L       256   Troponin I Latest Ref Range: <=0 04 ng/mL    0 03  0 02    Cholesterol Latest Ref Range: 50 - 200 mg/dL       211 (H)   Triglycerides Latest Ref Range: <=150 mg/dL       90   HDL Latest Ref Range: 40 - 60 mg/dL       83 (H)   LDL Direct Latest Ref Range: 0 - 100 mg/dL       110 (H)   WBC Latest Ref Range: 4 31 - 10 16 Thousand/uL  6 66     5 80   Red Blood Cell Count Latest Ref Range: 3 88 - 5 62 Million/uL  4 40     4 21   Hemoglobin Latest Ref Range: 12 0 - 17 0 g/dL  13 2     12 9   HCT Latest Ref Range: 36 5 - 49 3 %  39 2     38 4   MCV Latest Ref Range: 82 - 98 fL  89     91   MCH Latest Ref Range: 26 8 - 34 3 pg  30 0     30 6   MCHC Latest Ref Range: 31 4 - 37 4 g/dL  33 7     33 6   RDW Latest Ref Range: 11 6 - 15 1 %  14 8     13 9   Platelet Count Latest Ref Range: 149 - 390 Thousands/uL  251     188   MPV Latest Ref Range: 8 9 - 12 7 fL  8 7 (L)     9 3   Neutrophils % Latest Ref Range: 43 - 75 %  64        Lymphocytes Relative Latest Ref Range: 14 - 44 %  26        Monocytes Relative Latest Ref Range: 4 - 12 %  9        Eosinophils Latest Ref Range: 0 - 6 %  1        Basophils Relative Latest Ref Range: 0 - 1 %  0        Absolute Neutrophils Latest Ref Range: 1 85 - 7 62 Thousands/µL  4 28        Lymphocytes Absolute Latest Ref Range: 0 60 - 4 47 Thousands/µL  1 74        Absolute Monocytes Latest Ref Range: 0 17 - 1 22 Thousand/µL  0 58        Absolute Eosinophils Latest Ref Range: 0 00 - 0 61 Thousand/µL  0 05        Basophils Absolute Latest Ref Range: 0 00 - 0 10 Thousands/µL  0 01        AMPH/METH Latest Ref Range: Negative  Negative         BARBITURATE URINE Latest Ref Range: Negative  Negative         BENZODIAZEPINE URINE Latest Ref Range: Negative  Negative         THC URINE Latest Ref Range: Negative  Positive (A)         COCAINE URINE Latest Ref Range: Negative  Negative         METHADONE URINE Latest Ref Range: Negative  Negative         OPIATE URINE Latest Ref Range: Negative  Negative         PHENCYCLIDINE URINE Latest Ref Range: Negative  Negative         MRSA CULTURE Unknown     Rpt ((NONE))     XR CHEST PORTABLE Unknown   Rpt           Assessment/Plan:    1  Acute on chronic pancreatitis, appears to be most likely alcohol induced    Liver enzymes have been normal, imaging has suggested no evidence of gallbladder stones or biliary ductal obstruction    Triglycerides have also been normal   His lipase appears decreasing this morning and he appears to be clinically improving, reporting some ongoing abdominal pain but decreased since yesterday    - monitor abdominal exam, temperature, white blood cell count, lipase  - continue IV fluids  - will advance to clear liquid diet, I advised patient exercise caution at 1st and we can gradually advance the diet as he tolerates  - advised patient again about the importance of alcohol cessation

## 2019-08-08 NOTE — PROGRESS NOTES
08/08/19 53 Fauzia Ava    Patient Information   Mental Status Alert   Primary Caregiver Self   Support System Immediate family   Activities of Daily Living Prior to Admission   Functional Status Independent   Assistive Device Other (Comment)   Living Arrangement House   Ambulation Moderate assistance   Means of Transportation   Means of Transport to Appts: Drive Self     LOS/Readmit/MBP: LOS is 1 day, pt is not a readmit or MBP pt  Lives with family at this time, planning on moving at some point when able  Pt has an electric scooter in the home for DME and has no oxygen or neb  Pt has no HHC  PCP: Dr Taylor Shea at [address] [place] [address] in [address] [place] [address]  Pt has no advanced directives on the chart  Pt does drive  Pharmacy is [facility] [address], pt notes n issues with prescriptions  Pt currently without insurance, spoke with the Pt Financial Service Counselor and application was started for Medicaid today  Pt should have some coverage for Medical care in the near future once processed  Denies any DC needs at this time

## 2019-08-08 NOTE — ASSESSMENT & PLAN NOTE
Patient presented with epigastric pain, N/V  Lipase 1320, WBC and LFTs nml  CT A/P showed no acute pancreas findings   Liver/GB unremarkable  Alcohol level on admission 171  Likely alcohol induced acute pancreatitis  TGs 90  · GI consulted, recs appreciated  · RUQ US pending  · Lipase normalized, abdominal pain improving  · Advance diet per GI recs

## 2019-08-08 NOTE — PLAN OF CARE
Problem: Potential for Falls  Goal: Patient will remain free of falls  Description  INTERVENTIONS:  - Assess patient frequently for physical needs  -  Identify cognitive and physical deficits and behaviors that affect risk of falls  -  [place] fall precautions as indicated by assessment   - Educate patient/family on patient safety including physical limitations  - Instruct patient to call for assistance with activity based on assessment  - Modify environment to reduce risk of injury  - Consider OT/PT consult to assist with strengthening/mobility  Outcome: Progressing     Problem: NEUROSENSORY - ADULT  Goal: Achieves stable or improved neurological status  Description  INTERVENTIONS  - Monitor and report changes in neurological status  - Initiate measures to prevent increased intracranial pressure  - Maintain blood pressure and fluid volume within ordered parameters to optimize cerebral perfusion  - Monitor temperature, glucose, and sodium or any other associated labs   Initiate appropriate interventions as ordered  - Monitor for seizure activity   - Administer anti-seizure medications as ordered  Outcome: Progressing  Goal: Absence of seizures  Description  INTERVENTIONS  - Monitor for seizure activity  - Administer anti-seizure medications as ordered  - Monitor neurological status  Outcome: Progressing  Goal: Remains free of injury related to seizures activity  Description  INTERVENTIONS  - Maintain airway, patient safety  and administer oxygen as ordered  - Monitor patient for seizure activity, document and report duration and description of seizure to physician/advanced practitioner  - If seizure occurs,  ensure patient safety during seizure  - Reorient patient post seizure  - Seizure pads on all 4 side rails  - Instruct patient/family to notify RN of any seizure activity including if an aura is experienced  - Instruct patient/family to call for assistance with activity based on nursing assessment  - Administer anti-seizure medications as ordered  - Monitor fetal well being  Outcome: Progressing  Goal: Achieves maximal functionality and self care  Description  INTERVENTIONS  - Monitor swallowing and airway patency with patient fatigue and changes in neurological status  - Encourage and assist patient to increase activity and self care with guidance from rehab services  - Encourage visually impaired, hearing impaired and aphasic patients to use assistive/communication devices  Outcome: Progressing     Problem: GASTROINTESTINAL - ADULT  Goal: Minimal or absence of nausea and/or vomiting  Description  INTERVENTIONS:  - Administer IV fluids as ordered to ensure adequate hydration  - Maintain NPO status until nausea and vomiting are resolved  -  - Administer ordered antiemetic medications as needed  - Provide nonpharmacologic comfort measures as appropriate  - Advance diet as tolerated, if ordered  - Nutrition services referral to assist patient with adequate nutrition and appropriate food choices   Outcome: Progressing  Goal: Maintains or returns to baseline bowel function  Description  INTERVENTIONS:  - Assess bowel function  - Encourage oral fluids to ensure adequate hydration  - Administer IV fluids as ordered to ensure adequate hydration  - Administer ordered medications as needed  - Encourage mobilization and activity  - Nutrition services referral to assist patient with appropriate food choices  Outcome: Progressing  Goal: Maintains adequate nutritional intake  Description  INTERVENTIONS:  - Monitor percentage of each meal consumed  - Identify factors contributing to decreased intake, treat as appropriate  - Assist with meals as needed  - Monitor I&O, WT and lab values  - Obtain nutrition services referral as needed  Outcome: Progressing  Goal: Establish and maintain optimal ostomy function  Description  INTERVENTIONS:  - Assess bowel function  - Encourage oral fluids to ensure adequate hydration  - Administer IV fluids as ordered to ensure adequate hydration  - Administer ordered medications as needed  - Encourage mobilization and activity  - Nutrition services referral to assist patient with appropriate food choices  - Assess stoma site  Outcome: Progressing     Problem: METABOLIC, FLUID AND ELECTROLYTES - ADULT  Goal: Electrolytes maintained within normal limits  Description  INTERVENTIONS:  - Monitor labs and assess patient for signs and symptoms of electrolyte imbalances  - Administer electrolyte replacement as ordered  - Monitor response to electrolyte replacements, including repeat lab results as appropriate  - Instruct patient on fluid and nutrition as appropriate  Outcome: Progressing  Goal: Fluid balance maintained  Description  INTERVENTIONS:  - Monitor labs and assess for signs and symptoms of volume excess or deficit  - Monitor I/O and WT  - Instruct patient on fluid and nutrition as appropriate  Outcome: Progressing  Goal: Glucose maintained within target range  Description  INTERVENTIONS:  - Monitor Blood Glucose as ordered  - Assess for signs and symptoms of hyperglycemia and hypoglycemia  - Administer ordered medications to maintain glucose within target range  - Assess nutritional intake and initiate nutrition service referral as needed  Outcome: Progressing     Problem: MUSCULOSKELETAL - ADULT  Goal: Maintain or return mobility to safest level of function  Description  INTERVENTIONS:  - Assess patient's ability to carry out ADLs; assess patient's baseline for ADL function and identify physical deficits which impact ability to perform ADLs (bathing, care of mouth/teeth, toileting, grooming, dressing, etc )  - Assess/evaluate cause of self-care deficits   - Assess range of motion  - Assess patient's mobility; develop plan if impaired  - Assess patient's need for assistive devices and provide as appropriate  - Encourage maximum independence but intervene and supervise when necessary  - Involve family in performance of ADLs  - Assess for home care needs following discharge   - Request OT consult to assist with ADL evaluation and planning for discharge  - Provide patient education as appropriate  Outcome: Progressing  Goal: Maintain proper alignment of affected body part  Description  INTERVENTIONS:  - Support, maintain and protect limb and body alignment  - Provide pt/fam with appropriate education  Outcome: Progressing     Problem: Prexisting or High Potential for Compromised Skin Integrity  Goal: Skin integrity is maintained or improved  Description  INTERVENTIONS:  - Identify patients at risk for skin breakdown  - Assess and monitor skin integrity  - Assess and monitor nutrition and hydration status  - Monitor labs (i e  albumin)  - Assess for incontinence   - Turn and reposition patient  - Assist with mobility/ambulation  - Relieve pressure over bony prominences  - Avoid friction and shearing  - Provide appropriate hygiene as needed including keeping skin clean and dry  - Evaluate need for skin moisturizer/barrier cream  - Collaborate with interdisciplinary team (i e  Nutrition, Rehabilitation, etc )   - Patient/family teaching  Outcome: Progressing

## 2019-08-08 NOTE — ASSESSMENT & PLAN NOTE
Patient presenting with atypical chest pain radiating from right side up to right shoulder     Likely referred pain from pancreatitis and/or GERD symptoms but will rule out ACS  Serial troponins negative  EKG with no ischemic changed  Tele monitor shows sinus tachy  · Treat pancreatitis as noted  · Started PPI for reflux symptoms
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
## 2019-08-08 NOTE — ASSESSMENT & PLAN NOTE
Pt with known chronic alcohol abuse     EtOH level 171 on admission  · Place on CIWA protocol  · Seizure precautions  · Falls precautions   · MVI, thiamine and folate supplementation  · Patient knows of the available OP resources, does not want inpatient rehab

## 2019-08-09 PROBLEM — R65.10 SIRS (SYSTEMIC INFLAMMATORY RESPONSE SYNDROME) (HCC): Status: RESOLVED | Noted: 2019-08-07 | Resolved: 2019-08-09

## 2019-08-09 PROBLEM — R07.89 ATYPICAL CHEST PAIN: Status: RESOLVED | Noted: 2019-08-07 | Resolved: 2019-08-09

## 2019-08-09 LAB
ANION GAP SERPL CALCULATED.3IONS-SCNC: 8 MMOL/L (ref 4–13)
BUN SERPL-MCNC: 6 MG/DL (ref 5–25)
CALCIUM SERPL-MCNC: 8.6 MG/DL (ref 8.3–10.1)
CHLORIDE SERPL-SCNC: 102 MMOL/L (ref 100–108)
CO2 SERPL-SCNC: 27 MMOL/L (ref 21–32)
CREAT SERPL-MCNC: 0.84 MG/DL (ref 0.6–1.3)
ERYTHROCYTE [DISTWIDTH] IN BLOOD BY AUTOMATED COUNT: 13.6 % (ref 11.6–15.1)
GFR SERPL CREATININE-BSD FRML MDRD: 109 ML/MIN/1.73SQ M
GLUCOSE SERPL-MCNC: 107 MG/DL (ref 65–140)
HBV CORE AB SER QL: NORMAL
HBV CORE IGM SER QL: NORMAL
HBV SURFACE AG SER QL: NORMAL
HCT VFR BLD AUTO: 40.3 % (ref 36.5–49.3)
HCV AB SER QL: NORMAL
HGB BLD-MCNC: 13.2 G/DL (ref 12–17)
MAGNESIUM SERPL-MCNC: 1.7 MG/DL (ref 1.6–2.6)
MCH RBC QN AUTO: 29.7 PG (ref 26.8–34.3)
MCHC RBC AUTO-ENTMCNC: 32.8 G/DL (ref 31.4–37.4)
MCV RBC AUTO: 91 FL (ref 82–98)
PLATELET # BLD AUTO: 202 THOUSANDS/UL (ref 149–390)
PMV BLD AUTO: 9.1 FL (ref 8.9–12.7)
POTASSIUM SERPL-SCNC: 3.9 MMOL/L (ref 3.5–5.3)
RBC # BLD AUTO: 4.45 MILLION/UL (ref 3.88–5.62)
SODIUM SERPL-SCNC: 137 MMOL/L (ref 136–145)
WBC # BLD AUTO: 5.92 THOUSAND/UL (ref 4.31–10.16)

## 2019-08-09 PROCEDURE — 86704 HEP B CORE ANTIBODY TOTAL: CPT | Performed by: PHYSICIAN ASSISTANT

## 2019-08-09 PROCEDURE — 80048 BASIC METABOLIC PNL TOTAL CA: CPT | Performed by: STUDENT IN AN ORGANIZED HEALTH CARE EDUCATION/TRAINING PROGRAM

## 2019-08-09 PROCEDURE — 83735 ASSAY OF MAGNESIUM: CPT | Performed by: STUDENT IN AN ORGANIZED HEALTH CARE EDUCATION/TRAINING PROGRAM

## 2019-08-09 PROCEDURE — 86705 HEP B CORE ANTIBODY IGM: CPT | Performed by: PHYSICIAN ASSISTANT

## 2019-08-09 PROCEDURE — 85027 COMPLETE CBC AUTOMATED: CPT | Performed by: STUDENT IN AN ORGANIZED HEALTH CARE EDUCATION/TRAINING PROGRAM

## 2019-08-09 PROCEDURE — 87340 HEPATITIS B SURFACE AG IA: CPT | Performed by: PHYSICIAN ASSISTANT

## 2019-08-09 PROCEDURE — 86803 HEPATITIS C AB TEST: CPT | Performed by: PHYSICIAN ASSISTANT

## 2019-08-09 PROCEDURE — 99232 SBSQ HOSP IP/OBS MODERATE 35: CPT | Performed by: INTERNAL MEDICINE

## 2019-08-09 PROCEDURE — 99232 SBSQ HOSP IP/OBS MODERATE 35: CPT | Performed by: STUDENT IN AN ORGANIZED HEALTH CARE EDUCATION/TRAINING PROGRAM

## 2019-08-09 RX ADMIN — MELATONIN 6 MG: 3 TAB ORAL at 21:31

## 2019-08-09 RX ADMIN — ENOXAPARIN SODIUM 40 MG: 40 INJECTION SUBCUTANEOUS at 10:05

## 2019-08-09 RX ADMIN — FAMOTIDINE 20 MG: 10 INJECTION, SOLUTION INTRAVENOUS at 10:05

## 2019-08-09 RX ADMIN — GABAPENTIN 300 MG: 300 CAPSULE ORAL at 16:35

## 2019-08-09 RX ADMIN — FAMOTIDINE 20 MG: 10 INJECTION, SOLUTION INTRAVENOUS at 21:31

## 2019-08-09 RX ADMIN — CLONIDINE HYDROCHLORIDE 0.1 MG: 0.1 TABLET ORAL at 21:31

## 2019-08-09 RX ADMIN — AMLODIPINE BESYLATE 10 MG: 10 TABLET ORAL at 10:05

## 2019-08-09 RX ADMIN — GABAPENTIN 300 MG: 300 CAPSULE ORAL at 10:05

## 2019-08-09 RX ADMIN — SODIUM CHLORIDE, SODIUM LACTATE, POTASSIUM CHLORIDE, AND CALCIUM CHLORIDE 125 ML/HR: .6; .31; .03; .02 INJECTION, SOLUTION INTRAVENOUS at 20:00

## 2019-08-09 RX ADMIN — ATORVASTATIN CALCIUM 20 MG: 20 TABLET, FILM COATED ORAL at 21:31

## 2019-08-09 RX ADMIN — CLONIDINE HYDROCHLORIDE 0.1 MG: 0.1 TABLET ORAL at 10:05

## 2019-08-09 RX ADMIN — FOLIC ACID: 5 INJECTION, SOLUTION INTRAMUSCULAR; INTRAVENOUS; SUBCUTANEOUS at 10:15

## 2019-08-09 RX ADMIN — GABAPENTIN 300 MG: 300 CAPSULE ORAL at 21:31

## 2019-08-09 RX ADMIN — NICOTINE 1 PATCH: 14 PATCH TRANSDERMAL at 10:06

## 2019-08-09 RX ADMIN — SODIUM CHLORIDE, SODIUM LACTATE, POTASSIUM CHLORIDE, AND CALCIUM CHLORIDE 125 ML/HR: .6; .31; .03; .02 INJECTION, SOLUTION INTRAVENOUS at 07:28

## 2019-08-09 NOTE — PLAN OF CARE
Problem: Potential for Falls  Goal: Patient will remain free of falls  Description  INTERVENTIONS:  - Assess patient frequently for physical needs  -  Identify cognitive and physical deficits and behaviors that affect risk of falls  -  [place] fall precautions as indicated by assessment   - Educate patient/family on patient safety including physical limitations  - Instruct patient to call for assistance with activity based on assessment  - Modify environment to reduce risk of injury  - Consider OT/PT consult to assist with strengthening/mobility  Outcome: Progressing     Problem: NEUROSENSORY - ADULT  Goal: Achieves stable or improved neurological status  Description  INTERVENTIONS  - Monitor and report changes in neurological status  - Initiate measures to prevent increased intracranial pressure  - Maintain blood pressure and fluid volume within ordered parameters to optimize cerebral perfusion  - Monitor temperature, glucose, and sodium or any other associated labs   Initiate appropriate interventions as ordered  - Monitor for seizure activity   - Administer anti-seizure medications as ordered  Outcome: Progressing  Goal: Absence of seizures  Description  INTERVENTIONS  - Monitor for seizure activity  - Administer anti-seizure medications as ordered  - Monitor neurological status  Outcome: Progressing  Goal: Remains free of injury related to seizures activity  Description  INTERVENTIONS  - Maintain airway, patient safety  and administer oxygen as ordered  - Monitor patient for seizure activity, document and report duration and description of seizure to physician/advanced practitioner  - If seizure occurs,  ensure patient safety during seizure  - Reorient patient post seizure  - Seizure pads on all 4 side rails  - Instruct patient/family to notify RN of any seizure activity including if an aura is experienced  - Instruct patient/family to call for assistance with activity based on nursing assessment  - Administer anti-seizure medications as ordered  - Monitor fetal well being  Outcome: Progressing  Goal: Achieves maximal functionality and self care  Description  INTERVENTIONS  - Monitor swallowing and airway patency with patient fatigue and changes in neurological status  - Encourage and assist patient to increase activity and self care with guidance from rehab services  - Encourage visually impaired, hearing impaired and aphasic patients to use assistive/communication devices  Outcome: Progressing     Problem: GASTROINTESTINAL - ADULT  Goal: Minimal or absence of nausea and/or vomiting  Description  INTERVENTIONS:  - Administer IV fluids as ordered to ensure adequate hydration  - Maintain NPO status until nausea and vomiting are resolved  -  - Administer ordered antiemetic medications as needed  - Provide nonpharmacologic comfort measures as appropriate  - Advance diet as tolerated, if ordered  - Nutrition services referral to assist patient with adequate nutrition and appropriate food choices   Outcome: Progressing  Goal: Maintains or returns to baseline bowel function  Description  INTERVENTIONS:  - Assess bowel function  - Encourage oral fluids to ensure adequate hydration  - Administer IV fluids as ordered to ensure adequate hydration  - Administer ordered medications as needed  - Encourage mobilization and activity  - Nutrition services referral to assist patient with appropriate food choices  Outcome: Progressing  Goal: Maintains adequate nutritional intake  Description  INTERVENTIONS:  - Monitor percentage of each meal consumed  - Identify factors contributing to decreased intake, treat as appropriate  - Assist with meals as needed  - Monitor I&O, WT and lab values  - Obtain nutrition services referral as needed  Outcome: Progressing  Goal: Establish and maintain optimal ostomy function  Description  INTERVENTIONS:  - Assess bowel function  - Encourage oral fluids to ensure adequate hydration  - Administer IV fluids as ordered to ensure adequate hydration  - Administer ordered medications as needed  - Encourage mobilization and activity  - Nutrition services referral to assist patient with appropriate food choices  - Assess stoma site  Outcome: Progressing     Problem: METABOLIC, FLUID AND ELECTROLYTES - ADULT  Goal: Electrolytes maintained within normal limits  Description  INTERVENTIONS:  - Monitor labs and assess patient for signs and symptoms of electrolyte imbalances  - Administer electrolyte replacement as ordered  - Monitor response to electrolyte replacements, including repeat lab results as appropriate  - Instruct patient on fluid and nutrition as appropriate  Outcome: Progressing  Goal: Fluid balance maintained  Description  INTERVENTIONS:  - Monitor labs and assess for signs and symptoms of volume excess or deficit  - Monitor I/O and WT  - Instruct patient on fluid and nutrition as appropriate  Outcome: Progressing  Goal: Glucose maintained within target range  Description  INTERVENTIONS:  - Monitor Blood Glucose as ordered  - Assess for signs and symptoms of hyperglycemia and hypoglycemia  - Administer ordered medications to maintain glucose within target range  - Assess nutritional intake and initiate nutrition service referral as needed  Outcome: Progressing     Problem: MUSCULOSKELETAL - ADULT  Goal: Maintain or return mobility to safest level of function  Description  INTERVENTIONS:  - Assess patient's ability to carry out ADLs; assess patient's baseline for ADL function and identify physical deficits which impact ability to perform ADLs (bathing, care of mouth/teeth, toileting, grooming, dressing, etc )  - Assess/evaluate cause of self-care deficits   - Assess range of motion  - Assess patient's mobility; develop plan if impaired  - Assess patient's need for assistive devices and provide as appropriate  - Encourage maximum independence but intervene and supervise when necessary  - Involve family in performance of ADLs  - Assess for home care needs following discharge   - Request OT consult to assist with ADL evaluation and planning for discharge  - Provide patient education as appropriate  Outcome: Progressing  Goal: Maintain proper alignment of affected body part  Description  INTERVENTIONS:  - Support, maintain and protect limb and body alignment  - Provide pt/fam with appropriate education  Outcome: Progressing     Problem: Prexisting or High Potential for Compromised Skin Integrity  Goal: Skin integrity is maintained or improved  Description  INTERVENTIONS:  - Identify patients at risk for skin breakdown  - Assess and monitor skin integrity  - Assess and monitor nutrition and hydration status  - Monitor labs (i e  albumin)  - Assess for incontinence   - Turn and reposition patient  - Assist with mobility/ambulation  - Relieve pressure over bony prominences  - Avoid friction and shearing  - Provide appropriate hygiene as needed including keeping skin clean and dry  - Evaluate need for skin moisturizer/barrier cream  - Collaborate with interdisciplinary team (i e  Nutrition, Rehabilitation, etc )   - Patient/family teaching  Outcome: Progressing

## 2019-08-09 NOTE — ASSESSMENT & PLAN NOTE
· Pt with known chronic alcohol abuse     · EtOH level 171 on admission  · He was placed on CIWA protocol and had only mild WD symptoms  · Seizure precautions  · Falls precautions   · MVI, thiamine and folate supplementation  · Patient knows of the available OP resources, does not want inpatient rehab

## 2019-08-09 NOTE — ASSESSMENT & PLAN NOTE
· BP was elevated on admission  · Continued amlodipine  · Added clonidine 0 1,mg BID  · Blood pressure improve and he was discharged home with follow up with PCP for medication monitoring and BP checks
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
## 2019-08-09 NOTE — ASSESSMENT & PLAN NOTE
Patient presenting with atypical chest pain radiating from right side up to right shoulder     Likely referred pain from pancreatitis and/or GERD symptoms but will rule out ACS  Serial troponins negative  EKG with no ischemic changed  Tele monitor shows sinus tachy  · Treat pancreatitis as noted  · Started PPI for reflux symptoms  · Symptoms resolved

## 2019-08-09 NOTE — ASSESSMENT & PLAN NOTE
· Patient presented with epigastric pain, N/V   · Lipase 1320, WBC and LFTs nml  · CT A/P showed no acute pancreas findings  Liver/GB unremarkable  · Alcohol level on admission 171  · Likely alcohol induced acute pancreatitis  · TGs 90  · GI consulted  · RUQ US showed no acute findings  · Patient was treated with bowel rest and aggressive IVF hydration  · He improved and diet was advanced without issue   Lipase normalized  · He was discharged with instructions to abstain from EtOH and diet recommendations

## 2019-08-09 NOTE — PROGRESS NOTES
Progress Note - Sara Sequeira 62 y o  male MRN: [id number]    Unit/Bed#: [address] [place] [address] Encounter: [id number]        Subjective:   Patient reports feeling better today, says he has only minimal abdominal discomfort at this time, tolerating liquids  No fevers or chills    Objective:     Vitals: Blood pressure 141/93, pulse 70, temperature 98 1 °F (36 7 °C), resp  rate 18, height 6' (1 829 m), weight 87 2 kg (192 lb 3 9 oz), SpO2 99 %  ,Body mass index is 26 07 kg/m²  Intake/Output Summary (Last 24 hours) at 8/9/2019 1014  Last data filed at 8/9/2019 9877  Gross per 24 hour   Intake 3827 5 ml   Output 3285 ml   Net 542 5 ml       Physical Exam:   General appearance: alert, appears stated age and cooperative  Lungs: clear to auscultation bilaterally, no labored breathing/accessory muscle use  Heart: regular rate and rhythm, S1, S2 normal, no murmur, click, rub or gallop  Abdomen: soft, non-tender; bowel sounds normal; no masses,  no organomegaly  Extremities: no edema    Invasive Devices     Peripheral Intravenous Line            Peripheral IV 08/07/19 Left Antecubital 2 days                Lab, Imaging and other studies: I have personally reviewed pertinent reports      Admission on 08/07/2019   Component Date Value    WBC 08/07/2019 6 66     RBC 08/07/2019 4 40     Hemoglobin 08/07/2019 13 2     Hematocrit 08/07/2019 39 2     MCV 08/07/2019 89     MCH 08/07/2019 30 0     MCHC 08/07/2019 33 7     RDW 08/07/2019 14 8     MPV 08/07/2019 8 7*    Platelets 41/10/0417 251     Neutrophils Relative 08/07/2019 64     Lymphocytes Relative 08/07/2019 26     Monocytes Relative 08/07/2019 9     Eosinophils Relative 08/07/2019 1     Basophils Relative 08/07/2019 0     Neutrophils Absolute 08/07/2019 4 28     Lymphocytes Absolute 08/07/2019 1 74     Monocytes Absolute 08/07/2019 0 58     Eosinophils Absolute 08/07/2019 0 05     Basophils Absolute 08/07/2019 0 01     Sodium 08/07/2019 142     Potassium 08/07/2019 4 0     Chloride 08/07/2019 104     CO2 08/07/2019 22     ANION GAP 08/07/2019 16*    BUN 08/07/2019 9     Creatinine 08/07/2019 0 96     Glucose 08/07/2019 108     Calcium 08/07/2019 9 1     AST 08/07/2019 29     ALT 08/07/2019 46     Alkaline Phosphatase 08/07/2019 109     Total Protein 08/07/2019 8 3*    Albumin 08/07/2019 4 1     Total Bilirubin 08/07/2019 0 30     eGFR 08/07/2019 98     Troponin I 08/07/2019 <0 02     Ethanol Lvl 08/07/2019 171*    Lipase 08/07/2019 1,320*    Amph/Meth UR 08/07/2019 Negative     Barbiturate Ur 08/07/2019 Negative     Benzodiazepine Urine 08/07/2019 Negative     Cocaine Urine 08/07/2019 Negative     Methadone Urine 08/07/2019 Negative     Opiate Urine 08/07/2019 Negative     PCP Ur 08/07/2019 Negative     THC Urine 08/07/2019 Positive*    Ventricular Rate 08/07/2019 110     Atrial Rate 08/07/2019 267     QRSD Interval 08/07/2019 74     QT Interval 08/07/2019 334     QTC Interval 08/07/2019 452     QRS Axis 08/07/2019 9     T Wave [place] 08/07/2019 263     Ventricular Rate 08/07/2019 105     Atrial Rate 08/07/2019 105     MN Interval 08/07/2019 128     QRSD Interval 08/07/2019 82     QT Interval 08/07/2019 326     QTC Interval 08/07/2019 430     P Axis 08/07/2019 66     QRS Axis 08/07/2019 9     T Wave Axis 08/07/2019 -11     MRSA Culture Only 08/07/2019 No Methicillin Resistant Staphlyococcus aureus (MRSA) isolated     Troponin I 08/07/2019 0 03     Troponin I 08/07/2019 0 02     Lipase 08/08/2019 256     Sodium 08/08/2019 136     Potassium 08/08/2019 3 7     Chloride 08/08/2019 102     CO2 08/08/2019 25     ANION GAP 08/08/2019 9     BUN 08/08/2019 7     Creatinine 08/08/2019 0 73     Glucose 08/08/2019 82     Calcium 08/08/2019 8 4     AST 08/08/2019 21     ALT 08/08/2019 30     Alkaline Phosphatase 08/08/2019 89     Total Protein 08/08/2019 6 3*    Albumin 08/08/2019 3 2*    Total Bilirubin 08/08/2019 0 70  eGFR 08/08/2019 116     WBC 08/08/2019 5 80     RBC 08/08/2019 4 21     Hemoglobin 08/08/2019 12 9     Hematocrit 08/08/2019 38 4     MCV 08/08/2019 91     MCH 08/08/2019 30 6     MCHC 08/08/2019 33 6     RDW 08/08/2019 13 9     Platelets 71/69/6054 188     MPV 08/08/2019 9 3     Cholesterol 08/08/2019 211*    Triglycerides 08/08/2019 90     HDL, Direct 08/08/2019 83*    LDL Calculated 08/08/2019 110*    Magnesium 08/08/2019 1 5*    WBC 08/09/2019 5 92     RBC 08/09/2019 4 45     Hemoglobin 08/09/2019 13 2     Hematocrit 08/09/2019 40 3     MCV 08/09/2019 91     MCH 08/09/2019 29 7     MCHC 08/09/2019 32 8     RDW 08/09/2019 13 6     Platelets 60/03/5670 202     MPV 08/09/2019 9 1     Sodium 08/09/2019 137     Potassium 08/09/2019 3 9     Chloride 08/09/2019 102     CO2 08/09/2019 27     ANION GAP 08/09/2019 8     BUN 08/09/2019 6     Creatinine 08/09/2019 0 84     Glucose 08/09/2019 107     Calcium 08/09/2019 8 6     eGFR 08/09/2019 109     Magnesium 08/09/2019 1 7      Results for Telma Guillen (MRN [id number]) as of 8/9/2019 10:14   Ref   Range 8/7/2019 07:42 8/7/2019 08:00 8/7/2019 13:37 8/7/2019 13:40 8/7/2019 16:21 8/8/2019 05:15 8/9/2019 06:33   Sodium Latest Ref Range: 136 - 145 mmol/L      136 137   Potassium Latest Ref Range: 3 5 - 5 3 mmol/L      3 7 3 9   Chloride Latest Ref Range: 100 - 108 mmol/L      102 102   CO2 Latest Ref Range: 21 - 32 mmol/L      25 27   Anion Gap Latest Ref Range: 4 - 13 mmol/L      9 8   BUN Latest Ref Range: 5 - 25 mg/dL      7 6   Creatinine Latest Ref Range: 0 60 - 1 30 mg/dL      0 73 0 84   Glucose, Random Latest Ref Range: 65 - 140 mg/dL      82 107   Calcium Latest Ref Range: 8 3 - 10 1 mg/dL      8 4 8 6   AST Latest Ref Range: 5 - 45 U/L      21    ALT Latest Ref Range: 12 - 78 U/L      30    Alkaline Phosphatase Latest Ref Range: 46 - 116 U/L      89    Total Protein Latest Ref Range: 6 4 - 8 2 g/dL      6 3 (L)    Albumin Latest Ref Range: 3 5 - 5 0 g/dL      3 2 (L)    TOTAL BILIRUBIN Latest Ref Range: 0 20 - 1 00 mg/dL      0 70    eGFR Latest Units: ml/min/1 73sq m      116 109   Magnesium Latest Ref Range: 1 6 - 2 6 mg/dL      1 5 (L) 1 7   Lipase Latest Ref Range: 73 - 393 u/L      256    Troponin I Latest Ref Range: <=0 04 ng/mL   0 03  0 02     Cholesterol Latest Ref Range: 50 - 200 mg/dL      211 (H)    Triglycerides Latest Ref Range: <=150 mg/dL      90    HDL Latest Ref Range: 40 - 60 mg/dL      83 (H)    LDL Direct Latest Ref Range: 0 - 100 mg/dL      110 (H)    WBC Latest Ref Range: 4 31 - 10 16 Thousand/uL 6 66     5 80 5 92   Red Blood Cell Count Latest Ref Range: 3 88 - 5 62 Million/uL 4 40     4 21 4 45   Hemoglobin Latest Ref Range: 12 0 - 17 0 g/dL 13 2     12 9 13 2   HCT Latest Ref Range: 36 5 - 49 3 % 39 2     38 4 40 3   MCV Latest Ref Range: 82 - 98 fL 89     91 91   MCH Latest Ref Range: 26 8 - 34 3 pg 30 0     30 6 29 7   MCHC Latest Ref Range: 31 4 - 37 4 g/dL 33 7     33 6 32 8   RDW Latest Ref Range: 11 6 - 15 1 % 14 8     13 9 13 6   Platelet Count Latest Ref Range: 149 - 390 Thousands/uL 251     188 202   MPV Latest Ref Range: 8 9 - 12 7 fL 8 7 (L)     9 3 9 1   Neutrophils % Latest Ref Range: 43 - 75 % 64         Lymphocytes Relative Latest Ref Range: 14 - 44 % 26         Monocytes Relative Latest Ref Range: 4 - 12 % 9         Eosinophils Latest Ref Range: 0 - 6 % 1         Basophils Relative Latest Ref Range: 0 - 1 % 0         Absolute Neutrophils Latest Ref Range: 1 85 - 7 62 Thousands/µL 4 28         Lymphocytes Absolute Latest Ref Range: 0 60 - 4 47 Thousands/µL 1 74         Absolute Monocytes Latest Ref Range: 0 17 - 1 22 Thousand/µL 0 58         Absolute Eosinophils Latest Ref Range: 0 00 - 0 61 Thousand/µL 0 05         Basophils Absolute Latest Ref Range: 0 00 - 0 10 Thousands/µL 0 01         MRSA CULTURE Unknown    Rpt      XR CHEST PORTABLE Unknown  Rpt            Assessment/Plan:    1   Acute on chronic pancreatitis which appears to be alcohol induced, patient appears to be clinically improving at this time, lipase normalized    Triglycerides and liver enzymes appear to be within normal limits    - advised patient again about the importance of alcohol avoidance  - symptomatic management, pain control  - continue IV fluids  - advance to full liquid diet at this time, gradually advance thereafter to low-fat diet  - also check check hepatitis C antibody, patient sources past history of IV drug use and does not remember if he has been tested  - I spoke to Dr Travis Loza [facility]) regarding this patient's case and plan

## 2019-08-10 VITALS
RESPIRATION RATE: 18 BRPM | TEMPERATURE: 97.6 F | WEIGHT: 192.24 LBS | SYSTOLIC BLOOD PRESSURE: 121 MMHG | DIASTOLIC BLOOD PRESSURE: 83 MMHG | OXYGEN SATURATION: 100 % | HEART RATE: 75 BPM | HEIGHT: 72 IN | BODY MASS INDEX: 26.04 KG/M2

## 2019-08-10 PROBLEM — K85.20 ALCOHOL-INDUCED ACUTE PANCREATITIS WITHOUT INFECTION OR NECROSIS: Status: RESOLVED | Noted: 2019-08-07 | Resolved: 2019-08-10

## 2019-08-10 LAB
ANION GAP SERPL CALCULATED.3IONS-SCNC: 7 MMOL/L (ref 4–13)
BUN SERPL-MCNC: 9 MG/DL (ref 5–25)
CALCIUM SERPL-MCNC: 8.7 MG/DL (ref 8.3–10.1)
CHLORIDE SERPL-SCNC: 102 MMOL/L (ref 100–108)
CO2 SERPL-SCNC: 28 MMOL/L (ref 21–32)
CREAT SERPL-MCNC: 0.87 MG/DL (ref 0.6–1.3)
ERYTHROCYTE [DISTWIDTH] IN BLOOD BY AUTOMATED COUNT: 13.7 % (ref 11.6–15.1)
GFR SERPL CREATININE-BSD FRML MDRD: 108 ML/MIN/1.73SQ M
GLUCOSE SERPL-MCNC: 105 MG/DL (ref 65–140)
HCT VFR BLD AUTO: 39.4 % (ref 36.5–49.3)
HGB BLD-MCNC: 12.8 G/DL (ref 12–17)
MAGNESIUM SERPL-MCNC: 1.7 MG/DL (ref 1.6–2.6)
MCH RBC QN AUTO: 29.6 PG (ref 26.8–34.3)
MCHC RBC AUTO-ENTMCNC: 32.5 G/DL (ref 31.4–37.4)
MCV RBC AUTO: 91 FL (ref 82–98)
PLATELET # BLD AUTO: 195 THOUSANDS/UL (ref 149–390)
PMV BLD AUTO: 9.3 FL (ref 8.9–12.7)
POTASSIUM SERPL-SCNC: 4.3 MMOL/L (ref 3.5–5.3)
RBC # BLD AUTO: 4.33 MILLION/UL (ref 3.88–5.62)
SODIUM SERPL-SCNC: 137 MMOL/L (ref 136–145)
WBC # BLD AUTO: 6.58 THOUSAND/UL (ref 4.31–10.16)

## 2019-08-10 PROCEDURE — 99239 HOSP IP/OBS DSCHRG MGMT >30: CPT | Performed by: STUDENT IN AN ORGANIZED HEALTH CARE EDUCATION/TRAINING PROGRAM

## 2019-08-10 PROCEDURE — 83735 ASSAY OF MAGNESIUM: CPT | Performed by: STUDENT IN AN ORGANIZED HEALTH CARE EDUCATION/TRAINING PROGRAM

## 2019-08-10 PROCEDURE — 85027 COMPLETE CBC AUTOMATED: CPT | Performed by: STUDENT IN AN ORGANIZED HEALTH CARE EDUCATION/TRAINING PROGRAM

## 2019-08-10 PROCEDURE — 80048 BASIC METABOLIC PNL TOTAL CA: CPT | Performed by: STUDENT IN AN ORGANIZED HEALTH CARE EDUCATION/TRAINING PROGRAM

## 2019-08-10 RX ORDER — NICOTINE 21 MG/24HR
1 PATCH, TRANSDERMAL 24 HOURS TRANSDERMAL DAILY
Qty: 28 PATCH | Refills: 0 | Status: SHIPPED | OUTPATIENT
Start: 2019-08-10

## 2019-08-10 RX ORDER — CLONIDINE HYDROCHLORIDE 0.1 MG/1
0.1 TABLET ORAL EVERY 12 HOURS SCHEDULED
Qty: 60 TABLET | Refills: 0 | Status: SHIPPED | OUTPATIENT
Start: 2019-08-10

## 2019-08-10 RX ADMIN — NICOTINE 1 PATCH: 14 PATCH TRANSDERMAL at 09:37

## 2019-08-10 RX ADMIN — FAMOTIDINE 20 MG: 10 INJECTION, SOLUTION INTRAVENOUS at 09:38

## 2019-08-10 RX ADMIN — CLONIDINE HYDROCHLORIDE 0.1 MG: 0.1 TABLET ORAL at 09:38

## 2019-08-10 RX ADMIN — SODIUM CHLORIDE, SODIUM LACTATE, POTASSIUM CHLORIDE, AND CALCIUM CHLORIDE 125 ML/HR: .6; .31; .03; .02 INJECTION, SOLUTION INTRAVENOUS at 04:54

## 2019-08-10 RX ADMIN — AMLODIPINE BESYLATE 10 MG: 10 TABLET ORAL at 09:37

## 2019-08-10 RX ADMIN — GABAPENTIN 300 MG: 300 CAPSULE ORAL at 09:38

## 2019-08-10 NOTE — NURSING NOTE
IV removed prior to discharge  AVS reviewed with pt and prescriptions given to pt  Pt refused wheelchair and left via ambulation

## 2019-08-10 NOTE — DISCHARGE INSTRUCTIONS
Pancreatitis   WHAT YOU NEED TO KNOW:   Pancreatitis is inflammation of your pancreas  The pancreas is an organ that makes insulin  It also makes enzymes (digestive juices) that help your body digest food  Pancreatitis may be an acute (short-term) problem that happens only once  It may become a chronic (long-term) problem that comes and goes over time  DISCHARGE INSTRUCTIONS:   Seek care immediately if:   · You have severe pain in your abdomen and you are vomiting  Contact your healthcare provider if:   · You have a fever  · You continue to lose weight without trying  · Your skin or the whites of your eyes turn yellow  · You have questions or concerns about your condition or care  Medicines: You may need any of the following:  · Antibiotics  treat a bacterial infection  · Prescription pain medicine  may be given  Ask your healthcare provider how to take this medicine safely  Some prescription pain medicines contain acetaminophen  Do not take other medicines that contain acetaminophen without talking to your healthcare provider  Too much acetaminophen may cause liver damage  Prescription pain medicine may cause constipation  Ask your healthcare provider how to prevent or treat constipation  · Take your medicine as directed  Contact your healthcare provider if you think your medicine is not helping or if you have side effects  Tell him or her if you are allergic to any medicine  Keep a list of the medicines, vitamins, and herbs you take  Include the amounts, and when and why you take them  Bring the list or the pill bottles to follow-up visits  Carry your medicine list with you in case of an emergency  Self-care:   · Rest  when you feel it is needed  Slowly start to do more each day  Return to your usual activities as directed  · Do not drink any alcohol    If you need help to stop drinking, contact the following organization:   ¨ Alcoholics Anonymous  Web Address: http://www curtis info/      · Ask your healthcare provider or dietitian about the best foods to eat  You may need to eat foods that are low in fat if you have chronic pancreatitis  Follow up with your healthcare provider as directed:  Write down your questions so you remember to ask them during your visits  © 2017 [employer] Information is for End User's use only and may not be sold, redistributed or otherwise used for commercial purposes  All illustrations and images included in CareNotes® are the copyrighted property of [employer]  or [employer]  The above information is an  only  It is not intended as medical advice for individual conditions or treatments  Talk to your doctor, nurse or pharmacist before following any medical regimen to see if it is safe and effective for you

## 2019-08-10 NOTE — PLAN OF CARE
Problem: Potential for Falls  Goal: Patient will remain free of falls  Description  INTERVENTIONS:  - Assess patient frequently for physical needs  -  Identify cognitive and physical deficits and behaviors that affect risk of falls  -  [place] fall precautions as indicated by assessment   - Educate patient/family on patient safety including physical limitations  - Instruct patient to call for assistance with activity based on assessment  - Modify environment to reduce risk of injury  - Consider OT/PT consult to assist with strengthening/mobility  Outcome: Progressing     Problem: NEUROSENSORY - ADULT  Goal: Achieves stable or improved neurological status  Description  INTERVENTIONS  - Monitor and report changes in neurological status  - Initiate measures to prevent increased intracranial pressure  - Maintain blood pressure and fluid volume within ordered parameters to optimize cerebral perfusion  - Monitor temperature, glucose, and sodium or any other associated labs   Initiate appropriate interventions as ordered  - Monitor for seizure activity   - Administer anti-seizure medications as ordered  Outcome: Progressing  Goal: Absence of seizures  Description  INTERVENTIONS  - Monitor for seizure activity  - Administer anti-seizure medications as ordered  - Monitor neurological status  Outcome: Progressing  Goal: Remains free of injury related to seizures activity  Description  INTERVENTIONS  - Maintain airway, patient safety  and administer oxygen as ordered  - Monitor patient for seizure activity, document and report duration and description of seizure to physician/advanced practitioner  - If seizure occurs,  ensure patient safety during seizure  - Reorient patient post seizure  - Seizure pads on all 4 side rails  - Instruct patient/family to notify RN of any seizure activity including if an aura is experienced  - Instruct patient/family to call for assistance with activity based on nursing assessment  - Administer anti-seizure medications as ordered  - Monitor fetal well being  Outcome: Progressing  Goal: Achieves maximal functionality and self care  Description  INTERVENTIONS  - Monitor swallowing and airway patency with patient fatigue and changes in neurological status  - Encourage and assist patient to increase activity and self care with guidance from rehab services  - Encourage visually impaired, hearing impaired and aphasic patients to use assistive/communication devices  Outcome: Progressing     Problem: GASTROINTESTINAL - ADULT  Goal: Minimal or absence of nausea and/or vomiting  Description  INTERVENTIONS:  - Administer IV fluids as ordered to ensure adequate hydration  - Maintain NPO status until nausea and vomiting are resolved  -  - Administer ordered antiemetic medications as needed  - Provide nonpharmacologic comfort measures as appropriate  - Advance diet as tolerated, if ordered  - Nutrition services referral to assist patient with adequate nutrition and appropriate food choices   Outcome: Progressing  Goal: Maintains or returns to baseline bowel function  Description  INTERVENTIONS:  - Assess bowel function  - Encourage oral fluids to ensure adequate hydration  - Administer IV fluids as ordered to ensure adequate hydration  - Administer ordered medications as needed  - Encourage mobilization and activity  - Nutrition services referral to assist patient with appropriate food choices  Outcome: Progressing  Goal: Maintains adequate nutritional intake  Description  INTERVENTIONS:  - Monitor percentage of each meal consumed  - Identify factors contributing to decreased intake, treat as appropriate  - Assist with meals as needed  - Monitor I&O, WT and lab values  - Obtain nutrition services referral as needed  Outcome: Progressing  Goal: Establish and maintain optimal ostomy function  Description  INTERVENTIONS:  - Assess bowel function  - Encourage oral fluids to ensure adequate hydration  - Administer IV fluids as ordered to ensure adequate hydration  - Administer ordered medications as needed  - Encourage mobilization and activity  - Nutrition services referral to assist patient with appropriate food choices  - Assess stoma site  Outcome: Progressing     Problem: METABOLIC, FLUID AND ELECTROLYTES - ADULT  Goal: Electrolytes maintained within normal limits  Description  INTERVENTIONS:  - Monitor labs and assess patient for signs and symptoms of electrolyte imbalances  - Administer electrolyte replacement as ordered  - Monitor response to electrolyte replacements, including repeat lab results as appropriate  - Instruct patient on fluid and nutrition as appropriate  Outcome: Progressing  Goal: Fluid balance maintained  Description  INTERVENTIONS:  - Monitor labs and assess for signs and symptoms of volume excess or deficit  - Monitor I/O and WT  - Instruct patient on fluid and nutrition as appropriate  Outcome: Progressing  Goal: Glucose maintained within target range  Description  INTERVENTIONS:  - Monitor Blood Glucose as ordered  - Assess for signs and symptoms of hyperglycemia and hypoglycemia  - Administer ordered medications to maintain glucose within target range  - Assess nutritional intake and initiate nutrition service referral as needed  Outcome: Progressing     Problem: MUSCULOSKELETAL - ADULT  Goal: Maintain or return mobility to safest level of function  Description  INTERVENTIONS:  - Assess patient's ability to carry out ADLs; assess patient's baseline for ADL function and identify physical deficits which impact ability to perform ADLs (bathing, care of mouth/teeth, toileting, grooming, dressing, etc )  - Assess/evaluate cause of self-care deficits   - Assess range of motion  - Assess patient's mobility; develop plan if impaired  - Assess patient's need for assistive devices and provide as appropriate  - Encourage maximum independence but intervene and supervise when necessary  - Involve family in performance of ADLs  - Assess for home care needs following discharge   - Request OT consult to assist with ADL evaluation and planning for discharge  - Provide patient education as appropriate  Outcome: Progressing  Goal: Maintain proper alignment of affected body part  Description  INTERVENTIONS:  - Support, maintain and protect limb and body alignment  - Provide pt/fam with appropriate education  Outcome: Progressing     Problem: Prexisting or High Potential for Compromised Skin Integrity  Goal: Skin integrity is maintained or improved  Description  INTERVENTIONS:  - Identify patients at risk for skin breakdown  - Assess and monitor skin integrity  - Assess and monitor nutrition and hydration status  - Monitor labs (i e  albumin)  - Assess for incontinence   - Turn and reposition patient  - Assist with mobility/ambulation  - Relieve pressure over bony prominences  - Avoid friction and shearing  - Provide appropriate hygiene as needed including keeping skin clean and dry  - Evaluate need for skin moisturizer/barrier cream  - Collaborate with interdisciplinary team (i e  Nutrition, Rehabilitation, etc )   - Patient/family teaching  Outcome: Progressing
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
## 2019-08-10 NOTE — PLAN OF CARE
Problem: Potential for Falls  Goal: Patient will remain free of falls  Description  INTERVENTIONS:  - Assess patient frequently for physical needs  -  Identify cognitive and physical deficits and behaviors that affect risk of falls  -  [place] fall precautions as indicated by assessment   - Educate patient/family on patient safety including physical limitations  - Instruct patient to call for assistance with activity based on assessment  - Modify environment to reduce risk of injury  - Consider OT/PT consult to assist with strengthening/mobility  8/10/2019 0949 by Radha Mora RN  Outcome: Adequate for Discharge  8/10/2019 0806 by Radha Mora RN  Outcome: Progressing     Problem: NEUROSENSORY - ADULT  Goal: Achieves stable or improved neurological status  Description  INTERVENTIONS  - Monitor and report changes in neurological status  - Initiate measures to prevent increased intracranial pressure  - Maintain blood pressure and fluid volume within ordered parameters to optimize cerebral perfusion  - Monitor temperature, glucose, and sodium or any other associated labs   Initiate appropriate interventions as ordered  - Monitor for seizure activity   - Administer anti-seizure medications as ordered  8/10/2019 0949 by Radha Mora RN  Outcome: Adequate for Discharge  8/10/2019 4547 by Radha Mora RN  Outcome: Progressing  Goal: Absence of seizures  Description  INTERVENTIONS  - Monitor for seizure activity  - Administer anti-seizure medications as ordered  - Monitor neurological status  8/10/2019 0949 by Radha Mora RN  Outcome: Adequate for Discharge  8/10/2019 0806 by Radha Mora RN  Outcome: Progressing  Goal: Remains free of injury related to seizures activity  Description  INTERVENTIONS  - Maintain airway, patient safety  and administer oxygen as ordered  - Monitor patient for seizure activity, document and report duration and description of seizure to physician/advanced practitioner  - If seizure occurs,  ensure patient safety during seizure  - Reorient patient post seizure  - Seizure pads on all 4 side rails  - Instruct patient/family to notify RN of any seizure activity including if an aura is experienced  - Instruct patient/family to call for assistance with activity based on nursing assessment  - Administer anti-seizure medications as ordered  - Monitor fetal well being  8/10/2019 0949 by Romeo Hoover RN  Outcome: Adequate for Discharge  8/10/2019 0806 by Romeo Hoover RN  Outcome: Progressing  Goal: Achieves maximal functionality and self care  Description  INTERVENTIONS  - Monitor swallowing and airway patency with patient fatigue and changes in neurological status  - Encourage and assist patient to increase activity and self care with guidance from rehab services  - Encourage visually impaired, hearing impaired and aphasic patients to use assistive/communication devices  8/10/2019 0949 by Romeo Hoover RN  Outcome: Adequate for Discharge  8/10/2019 0806 by Romeo Hoover RN  Outcome: Progressing     Problem: GASTROINTESTINAL - ADULT  Goal: Minimal or absence of nausea and/or vomiting  Description  INTERVENTIONS:  - Administer IV fluids as ordered to ensure adequate hydration  - Maintain NPO status until nausea and vomiting are resolved  -  - Administer ordered antiemetic medications as needed  - Provide nonpharmacologic comfort measures as appropriate  - Advance diet as tolerated, if ordered  - Nutrition services referral to assist patient with adequate nutrition and appropriate food choices   8/10/2019 0949 by Romeo Hoover RN  Outcome: Adequate for Discharge  8/10/2019 0806 by Romeo Hoover RN  Outcome: Progressing  Goal: Maintains or returns to baseline bowel function  Description  INTERVENTIONS:  - Assess bowel function  - Encourage oral fluids to ensure adequate hydration  - Administer IV fluids as ordered to ensure adequate hydration  - Administer ordered medications as needed  - Encourage mobilization and activity  - Nutrition services referral to assist patient with appropriate food choices  8/10/2019 0949 by Radha Mora RN  Outcome: Adequate for Discharge  8/10/2019 0806 by Radha Mora RN  Outcome: Progressing  Goal: Maintains adequate nutritional intake  Description  INTERVENTIONS:  - Monitor percentage of each meal consumed  - Identify factors contributing to decreased intake, treat as appropriate  - Assist with meals as needed  - Monitor I&O, WT and lab values  - Obtain nutrition services referral as needed  8/10/2019 0949 by Radha Mora RN  Outcome: Adequate for Discharge  8/10/2019 0806 by Radha Mora RN  Outcome: Progressing  Goal: Establish and maintain optimal ostomy function  Description  INTERVENTIONS:  - Assess bowel function  - Encourage oral fluids to ensure adequate hydration  - Administer IV fluids as ordered to ensure adequate hydration  - Administer ordered medications as needed  - Encourage mobilization and activity  - Nutrition services referral to assist patient with appropriate food choices  - Assess stoma site  8/10/2019 0949 by Radha Mora RN  Outcome: Adequate for Discharge  8/10/2019 0806 by Radha Mora RN  Outcome: Progressing     Problem: METABOLIC, FLUID AND ELECTROLYTES - ADULT  Goal: Electrolytes maintained within normal limits  Description  INTERVENTIONS:  - Monitor labs and assess patient for signs and symptoms of electrolyte imbalances  - Administer electrolyte replacement as ordered  - Monitor response to electrolyte replacements, including repeat lab results as appropriate  - Instruct patient on fluid and nutrition as appropriate  8/10/2019 0949 by Radha Mora RN  Outcome: Adequate for Discharge  8/10/2019 0806 by Radha Mora RN  Outcome: Progressing  Goal: Fluid balance maintained  Description  INTERVENTIONS:  - Monitor labs and assess for signs and symptoms of volume excess or deficit  - Monitor I/O and WT  - Instruct patient on fluid and nutrition as appropriate  8/10/2019 0949 by Chyrel Bernheim Damien Mueller RN  Outcome: Adequate for Discharge  8/10/2019 1849 by José Lantigua RN  Outcome: Progressing  Goal: Glucose maintained within target range  Description  INTERVENTIONS:  - Monitor Blood Glucose as ordered  - Assess for signs and symptoms of hyperglycemia and hypoglycemia  - Administer ordered medications to maintain glucose within target range  - Assess nutritional intake and initiate nutrition service referral as needed  8/10/2019 0949 by José Lantigua RN  Outcome: Adequate for Discharge  8/10/2019 0806 by José Lantigua RN  Outcome: Progressing     Problem: MUSCULOSKELETAL - ADULT  Goal: Maintain or return mobility to safest level of function  Description  INTERVENTIONS:  - Assess patient's ability to carry out ADLs; assess patient's baseline for ADL function and identify physical deficits which impact ability to perform ADLs (bathing, care of mouth/teeth, toileting, grooming, dressing, etc )  - Assess/evaluate cause of self-care deficits   - Assess range of motion  - Assess patient's mobility; develop plan if impaired  - Assess patient's need for assistive devices and provide as appropriate  - Encourage maximum independence but intervene and supervise when necessary  - Involve family in performance of ADLs  - Assess for home care needs following discharge   - Request OT consult to assist with ADL evaluation and planning for discharge  - Provide patient education as appropriate  8/10/2019 0949 by José Lantigua RN  Outcome: Adequate for Discharge  8/10/2019 0806 by José Lantigua RN  Outcome: Progressing  Goal: Maintain proper alignment of affected body part  Description  INTERVENTIONS:  - Support, maintain and protect limb and body alignment  - Provide pt/fam with appropriate education  8/10/2019 0949 by José Lantigua RN  Outcome: Adequate for Discharge  8/10/2019 0806 by José Lantigua RN  Outcome: Progressing     Problem: Prexisting or High Potential for Compromised Skin Integrity  Goal: Skin integrity is maintained or improved  Description  INTERVENTIONS:  - Identify patients at risk for skin breakdown  - Assess and monitor skin integrity  - Assess and monitor nutrition and hydration status  - Monitor labs (i e  albumin)  - Assess for incontinence   - Turn and reposition patient  - Assist with mobility/ambulation  - Relieve pressure over bony prominences  - Avoid friction and shearing  - Provide appropriate hygiene as needed including keeping skin clean and dry  - Evaluate need for skin moisturizer/barrier cream  - Collaborate with interdisciplinary team (i e  Nutrition, Rehabilitation, etc )   - Patient/family teaching  8/10/2019 0949 by Ameya Hull RN  Outcome: Adequate for Discharge  8/10/2019 0806 by Ameya Hull, RN  Outcome: Progressing

## 2019-08-10 NOTE — SOCIAL WORK
Per PCP, pt stable for DC to home today  No current DCP needs noted   Pt to follow up with [facility] [employer] [facility] re: Medicaid application

## 2021-12-26 ENCOUNTER — HOSPITAL ENCOUNTER (EMERGENCY)
Facility: HOSPITAL | Age: 61
Discharge: HOME | End: 2021-12-26
Attending: EMERGENCY MEDICINE
Payer: COMMERCIAL

## 2021-12-26 ENCOUNTER — APPOINTMENT (EMERGENCY)
Dept: RADIOLOGY | Facility: HOSPITAL | Age: 61
End: 2021-12-26
Payer: COMMERCIAL

## 2021-12-26 VITALS
SYSTOLIC BLOOD PRESSURE: 184 MMHG | BODY MASS INDEX: 27.51 KG/M2 | DIASTOLIC BLOOD PRESSURE: 95 MMHG | WEIGHT: 202.82 LBS | TEMPERATURE: 96.4 F | RESPIRATION RATE: 17 BRPM | OXYGEN SATURATION: 98 % | HEART RATE: 82 BPM

## 2021-12-26 DIAGNOSIS — K29.80 DUODENITIS: Primary | ICD-10-CM

## 2021-12-26 DIAGNOSIS — K52.9 JEJUNITIS: ICD-10-CM

## 2021-12-26 LAB
ALBUMIN SERPL BCP-MCNC: 4.2 G/DL (ref 3.5–5)
ALP SERPL-CCNC: 145 U/L (ref 46–116)
ALT SERPL W P-5'-P-CCNC: 63 U/L (ref 12–78)
ANION GAP SERPL CALCULATED.3IONS-SCNC: 21 MMOL/L (ref 4–13)
AST SERPL W P-5'-P-CCNC: 36 U/L (ref 5–45)
BACTERIA UR QL AUTO: NORMAL /HPF
BASOPHILS # BLD AUTO: 0.01 THOUSANDS/ΜL (ref 0–0.1)
BASOPHILS NFR BLD AUTO: 0 % (ref 0–1)
BILIRUB SERPL-MCNC: 1.01 MG/DL (ref 0.2–1)
BILIRUB UR QL STRIP: ABNORMAL
BUN SERPL-MCNC: 25 MG/DL (ref 5–25)
CALCIUM SERPL-MCNC: 9.2 MG/DL (ref 8.3–10.1)
CHLORIDE SERPL-SCNC: 99 MMOL/L (ref 100–108)
CLARITY UR: CLEAR
CO2 SERPL-SCNC: 18 MMOL/L (ref 21–32)
COLOR UR: ABNORMAL
CREAT SERPL-MCNC: 1.21 MG/DL (ref 0.6–1.3)
EOSINOPHIL # BLD AUTO: 0.01 THOUSAND/ΜL (ref 0–0.61)
EOSINOPHIL NFR BLD AUTO: 0 % (ref 0–6)
ERYTHROCYTE [DISTWIDTH] IN BLOOD BY AUTOMATED COUNT: 15.5 % (ref 11.6–15.1)
GFR SERPL CREATININE-BSD FRML MDRD: 63 ML/MIN/1.73SQ M
GLUCOSE SERPL-MCNC: 124 MG/DL (ref 65–140)
GLUCOSE UR STRIP-MCNC: NEGATIVE MG/DL
HCT VFR BLD AUTO: 45.1 % (ref 36.5–49.3)
HGB BLD-MCNC: 16.7 G/DL (ref 12–17)
HGB UR QL STRIP.AUTO: ABNORMAL
IMM GRANULOCYTES # BLD AUTO: 0.07 THOUSAND/UL (ref 0–0.2)
IMM GRANULOCYTES NFR BLD AUTO: 0 % (ref 0–2)
KETONES UR STRIP-MCNC: ABNORMAL MG/DL
LEUKOCYTE ESTERASE UR QL STRIP: NEGATIVE
LIPASE SERPL-CCNC: 188 U/L (ref 73–393)
LYMPHOCYTES # BLD AUTO: 2 THOUSANDS/ΜL (ref 0.6–4.47)
LYMPHOCYTES NFR BLD AUTO: 12 % (ref 14–44)
MCH RBC QN AUTO: 32.9 PG (ref 26.8–34.3)
MCHC RBC AUTO-ENTMCNC: 37 G/DL (ref 31.4–37.4)
MCV RBC AUTO: 89 FL (ref 82–98)
MONOCYTES # BLD AUTO: 0.99 THOUSAND/ΜL (ref 0.17–1.22)
MONOCYTES NFR BLD AUTO: 6 % (ref 4–12)
NEUTROPHILS # BLD AUTO: 13.74 THOUSANDS/ΜL (ref 1.85–7.62)
NEUTS SEG NFR BLD AUTO: 82 % (ref 43–75)
NITRITE UR QL STRIP: NEGATIVE
NON-SQ EPI CELLS URNS QL MICRO: NORMAL /HPF
NRBC BLD AUTO-RTO: 0 /100 WBCS
PH UR STRIP.AUTO: 5.5 [PH]
PLATELET # BLD AUTO: 294 THOUSANDS/UL (ref 149–390)
PMV BLD AUTO: 9.5 FL (ref 8.9–12.7)
POTASSIUM SERPL-SCNC: 4.1 MMOL/L (ref 3.5–5.3)
PROT SERPL-MCNC: 8.9 G/DL (ref 6.4–8.2)
PROT UR STRIP-MCNC: NEGATIVE MG/DL
RBC # BLD AUTO: 5.07 MILLION/UL (ref 3.88–5.62)
RBC #/AREA URNS AUTO: NORMAL /HPF
SODIUM SERPL-SCNC: 138 MMOL/L (ref 136–145)
SP GR UR STRIP.AUTO: <=1.005 (ref 1–1.03)
UROBILINOGEN UR QL STRIP.AUTO: 0.2 E.U./DL
WBC # BLD AUTO: 16.82 THOUSAND/UL (ref 4.31–10.16)
WBC #/AREA URNS AUTO: NORMAL /HPF

## 2021-12-26 PROCEDURE — 99285 EMERGENCY DEPT VISIT HI MDM: CPT | Performed by: EMERGENCY MEDICINE

## 2021-12-26 PROCEDURE — 96374 THER/PROPH/DIAG INJ IV PUSH: CPT

## 2021-12-26 PROCEDURE — G1004 CDSM NDSC: HCPCS

## 2021-12-26 PROCEDURE — 81001 URINALYSIS AUTO W/SCOPE: CPT | Performed by: EMERGENCY MEDICINE

## 2021-12-26 PROCEDURE — 96375 TX/PRO/DX INJ NEW DRUG ADDON: CPT

## 2021-12-26 PROCEDURE — 80053 COMPREHEN METABOLIC PANEL: CPT | Performed by: EMERGENCY MEDICINE

## 2021-12-26 PROCEDURE — 36415 COLL VENOUS BLD VENIPUNCTURE: CPT | Performed by: EMERGENCY MEDICINE

## 2021-12-26 PROCEDURE — 96361 HYDRATE IV INFUSION ADD-ON: CPT

## 2021-12-26 PROCEDURE — 99284 EMERGENCY DEPT VISIT MOD MDM: CPT

## 2021-12-26 PROCEDURE — 74177 CT ABD & PELVIS W/CONTRAST: CPT

## 2021-12-26 PROCEDURE — 83690 ASSAY OF LIPASE: CPT | Performed by: EMERGENCY MEDICINE

## 2021-12-26 PROCEDURE — 85025 COMPLETE CBC W/AUTO DIFF WBC: CPT | Performed by: EMERGENCY MEDICINE

## 2021-12-26 RX ORDER — OMEPRAZOLE 20 MG/1
20 CAPSULE, DELAYED RELEASE ORAL DAILY
Qty: 21 CAPSULE | Refills: 0 | Status: SHIPPED | OUTPATIENT
Start: 2021-12-26

## 2021-12-26 RX ORDER — OMEPRAZOLE 20 MG/1
20 CAPSULE, DELAYED RELEASE ORAL DAILY
Qty: 21 CAPSULE | Refills: 0 | Status: SHIPPED | OUTPATIENT
Start: 2021-12-26 | End: 2021-12-26 | Stop reason: SDUPTHER

## 2021-12-26 RX ORDER — ONDANSETRON 4 MG/1
4 TABLET, ORALLY DISINTEGRATING ORAL EVERY 6 HOURS PRN
Qty: 10 TABLET | Refills: 0 | Status: SHIPPED | OUTPATIENT
Start: 2021-12-26

## 2021-12-26 RX ORDER — ONDANSETRON 4 MG/1
4 TABLET, ORALLY DISINTEGRATING ORAL EVERY 6 HOURS PRN
Qty: 10 TABLET | Refills: 0 | Status: SHIPPED | OUTPATIENT
Start: 2021-12-26 | End: 2021-12-26 | Stop reason: SDUPTHER

## 2021-12-26 RX ORDER — KETOROLAC TROMETHAMINE 30 MG/ML
15 INJECTION, SOLUTION INTRAMUSCULAR; INTRAVENOUS ONCE
Status: COMPLETED | OUTPATIENT
Start: 2021-12-26 | End: 2021-12-26

## 2021-12-26 RX ORDER — ONDANSETRON 4 MG/1
4 TABLET, ORALLY DISINTEGRATING ORAL ONCE
Status: COMPLETED | OUTPATIENT
Start: 2021-12-26 | End: 2021-12-26

## 2021-12-26 RX ADMIN — FAMOTIDINE 20 MG: 10 INJECTION, SOLUTION INTRAVENOUS at 18:40

## 2021-12-26 RX ADMIN — KETOROLAC TROMETHAMINE 15 MG: 30 INJECTION, SOLUTION INTRAMUSCULAR at 15:50

## 2021-12-26 RX ADMIN — SODIUM CHLORIDE 1000 ML: 0.9 INJECTION, SOLUTION INTRAVENOUS at 15:52

## 2021-12-26 RX ADMIN — ONDANSETRON 4 MG: 4 TABLET, ORALLY DISINTEGRATING ORAL at 13:57

## 2021-12-26 RX ADMIN — MORPHINE SULFATE 2 MG: 2 INJECTION, SOLUTION INTRAMUSCULAR; INTRAVENOUS at 15:47

## 2021-12-26 RX ADMIN — IOHEXOL 100 ML: 350 INJECTION, SOLUTION INTRAVENOUS at 16:13
